# Patient Record
Sex: FEMALE | Race: WHITE | Employment: UNEMPLOYED | URBAN - METROPOLITAN AREA
[De-identification: names, ages, dates, MRNs, and addresses within clinical notes are randomized per-mention and may not be internally consistent; named-entity substitution may affect disease eponyms.]

---

## 2022-08-21 ENCOUNTER — HOSPITAL ENCOUNTER (INPATIENT)
Age: 57
LOS: 5 days | Discharge: HOME | DRG: 174 | End: 2022-08-26
Attending: EMERGENCY MEDICINE | Admitting: STUDENT IN AN ORGANIZED HEALTH CARE EDUCATION/TRAINING PROGRAM
Payer: COMMERCIAL

## 2022-08-21 ENCOUNTER — APPOINTMENT (OUTPATIENT)
Dept: GENERAL RADIOLOGY | Age: 57
DRG: 174 | End: 2022-08-21
Attending: STUDENT IN AN ORGANIZED HEALTH CARE EDUCATION/TRAINING PROGRAM
Payer: COMMERCIAL

## 2022-08-21 ENCOUNTER — APPOINTMENT (OUTPATIENT)
Dept: GENERAL RADIOLOGY | Age: 57
DRG: 174 | End: 2022-08-21
Attending: EMERGENCY MEDICINE
Payer: COMMERCIAL

## 2022-08-21 DIAGNOSIS — E87.1 HYPONATREMIA: ICD-10-CM

## 2022-08-21 DIAGNOSIS — I50.21 ACUTE HFREF (HEART FAILURE WITH REDUCED EJECTION FRACTION) (HCC): ICD-10-CM

## 2022-08-21 DIAGNOSIS — I21.3 ST ELEVATION MYOCARDIAL INFARCTION (STEMI), UNSPECIFIED ARTERY (HCC): ICD-10-CM

## 2022-08-21 DIAGNOSIS — I21.09 ST ELEVATION MYOCARDIAL INFARCTION (STEMI) OF ANTERIOR WALL (HCC): Primary | ICD-10-CM

## 2022-08-21 DIAGNOSIS — I21.02 ST ELEVATION MYOCARDIAL INFARCTION INVOLVING LEFT ANTERIOR DESCENDING (LAD) CORONARY ARTERY (HCC): ICD-10-CM

## 2022-08-21 DIAGNOSIS — G40.909 NONINTRACTABLE EPILEPSY WITHOUT STATUS EPILEPTICUS, UNSPECIFIED EPILEPSY TYPE (HCC): ICD-10-CM

## 2022-08-21 LAB
ACT BLD: 265 SECS (ref 79–138)
ACT BLD: 341 SECS (ref 79–138)
ALBUMIN SERPL-MCNC: 2.3 G/DL (ref 3.5–5)
ALBUMIN SERPL-MCNC: 2.8 G/DL (ref 3.5–5)
ALBUMIN SERPL-MCNC: 3.5 G/DL (ref 3.5–5)
ALBUMIN/GLOB SERPL: 0.9 {RATIO} (ref 1.1–2.2)
ALP SERPL-CCNC: 87 U/L (ref 45–117)
ALT SERPL-CCNC: 33 U/L (ref 12–78)
ANION GAP SERPL CALC-SCNC: 10 MMOL/L (ref 5–15)
ANION GAP SERPL CALC-SCNC: 12 MMOL/L (ref 5–15)
ANION GAP SERPL CALC-SCNC: 9 MMOL/L (ref 5–15)
APPEARANCE UR: CLEAR
AST SERPL-CCNC: 139 U/L (ref 15–37)
ATRIAL RATE: 105 BPM
ATRIAL RATE: 106 BPM
ATRIAL RATE: 86 BPM
BACTERIA URNS QL MICRO: NEGATIVE /HPF
BASOPHILS # BLD: 0 K/UL (ref 0–0.1)
BASOPHILS NFR BLD: 0 % (ref 0–1)
BILIRUB SERPL-MCNC: 0.7 MG/DL (ref 0.2–1)
BILIRUB UR QL: NEGATIVE
BNP SERPL-MCNC: ABNORMAL PG/ML
BUN SERPL-MCNC: 4 MG/DL (ref 6–20)
BUN SERPL-MCNC: 5 MG/DL (ref 6–20)
BUN SERPL-MCNC: 6 MG/DL (ref 6–20)
BUN/CREAT SERPL: 11 (ref 12–20)
BUN/CREAT SERPL: 8 (ref 12–20)
BUN/CREAT SERPL: 9 (ref 12–20)
CALCIUM SERPL-MCNC: 6.3 MG/DL (ref 8.5–10.1)
CALCIUM SERPL-MCNC: 8.1 MG/DL (ref 8.5–10.1)
CALCIUM SERPL-MCNC: 8.8 MG/DL (ref 8.5–10.1)
CALCULATED P AXIS, ECG09: 52 DEGREES
CALCULATED P AXIS, ECG09: 66 DEGREES
CALCULATED P AXIS, ECG09: 75 DEGREES
CALCULATED R AXIS, ECG10: 40 DEGREES
CALCULATED R AXIS, ECG10: 56 DEGREES
CALCULATED R AXIS, ECG10: 76 DEGREES
CALCULATED T AXIS, ECG11: 7 DEGREES
CALCULATED T AXIS, ECG11: 82 DEGREES
CALCULATED T AXIS, ECG11: 98 DEGREES
CHLORIDE SERPL-SCNC: 84 MMOL/L (ref 97–108)
CHLORIDE SERPL-SCNC: 85 MMOL/L (ref 97–108)
CHLORIDE SERPL-SCNC: 97 MMOL/L (ref 97–108)
CHLORIDE UR-SCNC: 12 MMOL/L
CHOLEST SERPL-MCNC: 221 MG/DL
CO2 SERPL-SCNC: 21 MMOL/L (ref 21–32)
CO2 SERPL-SCNC: 22 MMOL/L (ref 21–32)
CO2 SERPL-SCNC: 24 MMOL/L (ref 21–32)
COLOR UR: ABNORMAL
COMMENT, HOLDF: NORMAL
CREAT SERPL-MCNC: 0.35 MG/DL (ref 0.55–1.02)
CREAT SERPL-MCNC: 0.6 MG/DL (ref 0.55–1.02)
CREAT SERPL-MCNC: 0.64 MG/DL (ref 0.55–1.02)
CREAT UR-MCNC: 43.7 MG/DL
D DIMER PPP FEU-MCNC: <0.19 MG/L FEU (ref 0–0.65)
DIAGNOSIS, 93000: NORMAL
DIFFERENTIAL METHOD BLD: ABNORMAL
EOSINOPHIL # BLD: 0 K/UL (ref 0–0.4)
EOSINOPHIL NFR BLD: 0 % (ref 0–7)
EPITH CASTS URNS QL MICRO: ABNORMAL /LPF
ERYTHROCYTE [DISTWIDTH] IN BLOOD BY AUTOMATED COUNT: 12.4 % (ref 11.5–14.5)
GLOBULIN SER CALC-MCNC: 3.8 G/DL (ref 2–4)
GLUCOSE SERPL-MCNC: 130 MG/DL (ref 65–100)
GLUCOSE SERPL-MCNC: 146 MG/DL (ref 65–100)
GLUCOSE SERPL-MCNC: 96 MG/DL (ref 65–100)
GLUCOSE UR STRIP.AUTO-MCNC: NEGATIVE MG/DL
HCT VFR BLD AUTO: 39.6 % (ref 35–47)
HDLC SERPL-MCNC: 57 MG/DL
HDLC SERPL: 3.9 {RATIO} (ref 0–5)
HGB BLD-MCNC: 14.6 G/DL (ref 11.5–16)
HGB UR QL STRIP: NEGATIVE
IMM GRANULOCYTES # BLD AUTO: 0 K/UL (ref 0–0.04)
IMM GRANULOCYTES NFR BLD AUTO: 0 % (ref 0–0.5)
KETONES UR QL STRIP.AUTO: 80 MG/DL
LDLC SERPL CALC-MCNC: 152 MG/DL (ref 0–100)
LEUKOCYTE ESTERASE UR QL STRIP.AUTO: NEGATIVE
LYMPHOCYTES # BLD: 1.3 K/UL (ref 0.8–3.5)
LYMPHOCYTES NFR BLD: 11 % (ref 12–49)
MAGNESIUM SERPL-MCNC: 1.6 MG/DL (ref 1.6–2.4)
MCH RBC QN AUTO: 32.8 PG (ref 26–34)
MCHC RBC AUTO-ENTMCNC: 36.9 G/DL (ref 30–36.5)
MCV RBC AUTO: 89 FL (ref 80–99)
MONOCYTES # BLD: 1.6 K/UL (ref 0–1)
MONOCYTES NFR BLD: 13 % (ref 5–13)
NEUTS SEG # BLD: 9.3 K/UL (ref 1.8–8)
NEUTS SEG NFR BLD: 76 % (ref 32–75)
NITRITE UR QL STRIP.AUTO: NEGATIVE
NRBC # BLD: 0 K/UL (ref 0–0.01)
NRBC BLD-RTO: 0 PER 100 WBC
OSMOLALITY SERPL: 261 MOSM/KG H2O
OSMOLALITY UR: 453 MOSM/KG H2O
OSMOLALITY UR: 777 MOSM/KG H2O
P-R INTERVAL, ECG05: 128 MS
P-R INTERVAL, ECG05: 130 MS
P-R INTERVAL, ECG05: 136 MS
PH UR STRIP: 6.5 [PH] (ref 5–8)
PHOSPHATE SERPL-MCNC: 1.9 MG/DL (ref 2.6–4.7)
PHOSPHATE SERPL-MCNC: 2.1 MG/DL (ref 2.6–4.7)
PLATELET # BLD AUTO: 298 K/UL (ref 150–400)
PMV BLD AUTO: 9.5 FL (ref 8.9–12.9)
POTASSIUM SERPL-SCNC: 2.6 MMOL/L (ref 3.5–5.1)
POTASSIUM SERPL-SCNC: 3.5 MMOL/L (ref 3.5–5.1)
POTASSIUM SERPL-SCNC: 3.6 MMOL/L (ref 3.5–5.1)
PROT SERPL-MCNC: 7.3 G/DL (ref 6.4–8.2)
PROT UR STRIP-MCNC: 100 MG/DL
Q-T INTERVAL, ECG07: 342 MS
Q-T INTERVAL, ECG07: 356 MS
Q-T INTERVAL, ECG07: 356 MS
QRS DURATION, ECG06: 78 MS
QRS DURATION, ECG06: 78 MS
QRS DURATION, ECG06: 80 MS
QTC CALCULATION (BEZET), ECG08: 426 MS
QTC CALCULATION (BEZET), ECG08: 452 MS
QTC CALCULATION (BEZET), ECG08: 472 MS
RBC # BLD AUTO: 4.45 M/UL (ref 3.8–5.2)
RBC #/AREA URNS HPF: ABNORMAL /HPF (ref 0–5)
SAMPLES BEING HELD,HOLD: NORMAL
SODIUM SERPL-SCNC: 117 MMOL/L (ref 136–145)
SODIUM SERPL-SCNC: 119 MMOL/L (ref 136–145)
SODIUM SERPL-SCNC: 128 MMOL/L (ref 136–145)
SODIUM UR-SCNC: 14 MMOL/L
SODIUM UR-SCNC: 5 MMOL/L
SP GR UR REFRACTOMETRY: 1.01
T3FREE SERPL-MCNC: 1.2 PG/ML (ref 2.2–4)
T4 FREE SERPL-MCNC: 0.9 NG/DL (ref 0.8–1.5)
TRIGL SERPL-MCNC: 60 MG/DL (ref ?–150)
TROPONIN-HIGH SENSITIVITY: ABNORMAL NG/L (ref 0–51)
TSH SERPL DL<=0.05 MIU/L-ACNC: 1.44 UIU/ML (ref 0.36–3.74)
UROBILINOGEN UR QL STRIP.AUTO: 1 EU/DL (ref 0.2–1)
VENTRICULAR RATE, ECG03: 105 BPM
VENTRICULAR RATE, ECG03: 106 BPM
VENTRICULAR RATE, ECG03: 86 BPM
VLDLC SERPL CALC-MCNC: 12 MG/DL
WBC # BLD AUTO: 12.3 K/UL (ref 3.6–11)
WBC URNS QL MICRO: ABNORMAL /HPF (ref 0–4)

## 2022-08-21 PROCEDURE — 80061 LIPID PANEL: CPT

## 2022-08-21 PROCEDURE — 74011000636 HC RX REV CODE- 636: Performed by: STUDENT IN AN ORGANIZED HEALTH CARE EDUCATION/TRAINING PROGRAM

## 2022-08-21 PROCEDURE — 65620000000 HC RM CCU GENERAL

## 2022-08-21 PROCEDURE — C1769 GUIDE WIRE: HCPCS | Performed by: STUDENT IN AN ORGANIZED HEALTH CARE EDUCATION/TRAINING PROGRAM

## 2022-08-21 PROCEDURE — 84481 FREE ASSAY (FT-3): CPT

## 2022-08-21 PROCEDURE — 77030004532 HC CATH ANGI DX IMP BSC -A: Performed by: STUDENT IN AN ORGANIZED HEALTH CARE EDUCATION/TRAINING PROGRAM

## 2022-08-21 PROCEDURE — 93005 ELECTROCARDIOGRAM TRACING: CPT

## 2022-08-21 PROCEDURE — 85379 FIBRIN DEGRADATION QUANT: CPT

## 2022-08-21 PROCEDURE — 85347 COAGULATION TIME ACTIVATED: CPT

## 2022-08-21 PROCEDURE — 83930 ASSAY OF BLOOD OSMOLALITY: CPT

## 2022-08-21 PROCEDURE — 77030018729 HC ELECTRD DEFIB PAD CARD -B: Performed by: STUDENT IN AN ORGANIZED HEALTH CARE EDUCATION/TRAINING PROGRAM

## 2022-08-21 PROCEDURE — C1725 CATH, TRANSLUMIN NON-LASER: HCPCS | Performed by: STUDENT IN AN ORGANIZED HEALTH CARE EDUCATION/TRAINING PROGRAM

## 2022-08-21 PROCEDURE — 99152 MOD SED SAME PHYS/QHP 5/>YRS: CPT | Performed by: STUDENT IN AN ORGANIZED HEALTH CARE EDUCATION/TRAINING PROGRAM

## 2022-08-21 PROCEDURE — 74011250637 HC RX REV CODE- 250/637: Performed by: STUDENT IN AN ORGANIZED HEALTH CARE EDUCATION/TRAINING PROGRAM

## 2022-08-21 PROCEDURE — 74011250637 HC RX REV CODE- 250/637: Performed by: INTERNAL MEDICINE

## 2022-08-21 PROCEDURE — 81001 URINALYSIS AUTO W/SCOPE: CPT

## 2022-08-21 PROCEDURE — 84300 ASSAY OF URINE SODIUM: CPT

## 2022-08-21 PROCEDURE — 92921 HC PTCA SNGL MAJOR COR VESL/BRNCH  EA ADD LC: CPT | Performed by: STUDENT IN AN ORGANIZED HEALTH CARE EDUCATION/TRAINING PROGRAM

## 2022-08-21 PROCEDURE — 96374 THER/PROPH/DIAG INJ IV PUSH: CPT

## 2022-08-21 PROCEDURE — 4A023N7 MEASUREMENT OF CARDIAC SAMPLING AND PRESSURE, LEFT HEART, PERCUTANEOUS APPROACH: ICD-10-PCS | Performed by: STUDENT IN AN ORGANIZED HEALTH CARE EDUCATION/TRAINING PROGRAM

## 2022-08-21 PROCEDURE — 77030013715 HC INFL SYS MRTM -B: Performed by: STUDENT IN AN ORGANIZED HEALTH CARE EDUCATION/TRAINING PROGRAM

## 2022-08-21 PROCEDURE — C1894 INTRO/SHEATH, NON-LASER: HCPCS | Performed by: STUDENT IN AN ORGANIZED HEALTH CARE EDUCATION/TRAINING PROGRAM

## 2022-08-21 PROCEDURE — 99285 EMERGENCY DEPT VISIT HI MDM: CPT

## 2022-08-21 PROCEDURE — 84443 ASSAY THYROID STIM HORMONE: CPT

## 2022-08-21 PROCEDURE — C1874 STENT, COATED/COV W/DEL SYS: HCPCS | Performed by: STUDENT IN AN ORGANIZED HEALTH CARE EDUCATION/TRAINING PROGRAM

## 2022-08-21 PROCEDURE — 82570 ASSAY OF URINE CREATININE: CPT

## 2022-08-21 PROCEDURE — 84484 ASSAY OF TROPONIN QUANT: CPT

## 2022-08-21 PROCEDURE — 99153 MOD SED SAME PHYS/QHP EA: CPT | Performed by: STUDENT IN AN ORGANIZED HEALTH CARE EDUCATION/TRAINING PROGRAM

## 2022-08-21 PROCEDURE — 83935 ASSAY OF URINE OSMOLALITY: CPT

## 2022-08-21 PROCEDURE — 74011250636 HC RX REV CODE- 250/636: Performed by: STUDENT IN AN ORGANIZED HEALTH CARE EDUCATION/TRAINING PROGRAM

## 2022-08-21 PROCEDURE — 77030012468 HC VLV BLEEDBK CNTRL ABBT -B: Performed by: STUDENT IN AN ORGANIZED HEALTH CARE EDUCATION/TRAINING PROGRAM

## 2022-08-21 PROCEDURE — 74011250636 HC RX REV CODE- 250/636: Performed by: EMERGENCY MEDICINE

## 2022-08-21 PROCEDURE — 92928 PRQ TCAT PLMT NTRAC ST 1 LES: CPT | Performed by: STUDENT IN AN ORGANIZED HEALTH CARE EDUCATION/TRAINING PROGRAM

## 2022-08-21 PROCEDURE — 82436 ASSAY OF URINE CHLORIDE: CPT

## 2022-08-21 PROCEDURE — 80053 COMPREHEN METABOLIC PANEL: CPT

## 2022-08-21 PROCEDURE — B2111ZZ FLUOROSCOPY OF MULTIPLE CORONARY ARTERIES USING LOW OSMOLAR CONTRAST: ICD-10-PCS | Performed by: STUDENT IN AN ORGANIZED HEALTH CARE EDUCATION/TRAINING PROGRAM

## 2022-08-21 PROCEDURE — C1887 CATHETER, GUIDING: HCPCS | Performed by: STUDENT IN AN ORGANIZED HEALTH CARE EDUCATION/TRAINING PROGRAM

## 2022-08-21 PROCEDURE — 83735 ASSAY OF MAGNESIUM: CPT

## 2022-08-21 PROCEDURE — 92941 PRQ TRLML REVSC TOT OCCL AMI: CPT | Performed by: STUDENT IN AN ORGANIZED HEALTH CARE EDUCATION/TRAINING PROGRAM

## 2022-08-21 PROCEDURE — 83880 ASSAY OF NATRIURETIC PEPTIDE: CPT

## 2022-08-21 PROCEDURE — 77030019569 HC BND COMPR RAD TERU -B: Performed by: STUDENT IN AN ORGANIZED HEALTH CARE EDUCATION/TRAINING PROGRAM

## 2022-08-21 PROCEDURE — 027135Z DILATION OF CORONARY ARTERY, TWO ARTERIES WITH TWO DRUG-ELUTING INTRALUMINAL DEVICES, PERCUTANEOUS APPROACH: ICD-10-PCS | Performed by: STUDENT IN AN ORGANIZED HEALTH CARE EDUCATION/TRAINING PROGRAM

## 2022-08-21 PROCEDURE — 93571 IV DOP VEL&/PRESS C FLO 1ST: CPT | Performed by: STUDENT IN AN ORGANIZED HEALTH CARE EDUCATION/TRAINING PROGRAM

## 2022-08-21 PROCEDURE — 74011250636 HC RX REV CODE- 250/636: Performed by: NURSE PRACTITIONER

## 2022-08-21 PROCEDURE — 74011000250 HC RX REV CODE- 250: Performed by: STUDENT IN AN ORGANIZED HEALTH CARE EDUCATION/TRAINING PROGRAM

## 2022-08-21 PROCEDURE — 96375 TX/PRO/DX INJ NEW DRUG ADDON: CPT

## 2022-08-21 PROCEDURE — 74011000258 HC RX REV CODE- 258: Performed by: INTERNAL MEDICINE

## 2022-08-21 PROCEDURE — 84439 ASSAY OF FREE THYROXINE: CPT

## 2022-08-21 PROCEDURE — 74011250637 HC RX REV CODE- 250/637: Performed by: EMERGENCY MEDICINE

## 2022-08-21 PROCEDURE — 80069 RENAL FUNCTION PANEL: CPT

## 2022-08-21 PROCEDURE — B2151ZZ FLUOROSCOPY OF LEFT HEART USING LOW OSMOLAR CONTRAST: ICD-10-PCS | Performed by: STUDENT IN AN ORGANIZED HEALTH CARE EDUCATION/TRAINING PROGRAM

## 2022-08-21 PROCEDURE — 36415 COLL VENOUS BLD VENIPUNCTURE: CPT

## 2022-08-21 PROCEDURE — 85025 COMPLETE CBC W/AUTO DIFF WBC: CPT

## 2022-08-21 PROCEDURE — 77030010221 HC SPLNT WR POS TELE -B: Performed by: STUDENT IN AN ORGANIZED HEALTH CARE EDUCATION/TRAINING PROGRAM

## 2022-08-21 PROCEDURE — 93458 L HRT ARTERY/VENTRICLE ANGIO: CPT | Performed by: STUDENT IN AN ORGANIZED HEALTH CARE EDUCATION/TRAINING PROGRAM

## 2022-08-21 PROCEDURE — 71045 X-RAY EXAM CHEST 1 VIEW: CPT

## 2022-08-21 PROCEDURE — 74011250636 HC RX REV CODE- 250/636: Performed by: INTERNAL MEDICINE

## 2022-08-21 DEVICE — STENT RONYX27518UX RESOLUTE ONYX 2.75X18
Type: IMPLANTABLE DEVICE | Status: FUNCTIONAL
Brand: RESOLUTE ONYX™

## 2022-08-21 RX ORDER — GUAIFENESIN 100 MG/5ML
324 LIQUID (ML) ORAL
Status: COMPLETED | OUTPATIENT
Start: 2022-08-21 | End: 2022-08-21

## 2022-08-21 RX ORDER — SODIUM CHLORIDE 9 MG/ML
50 INJECTION, SOLUTION INTRAVENOUS CONTINUOUS
Status: DISCONTINUED | OUTPATIENT
Start: 2022-08-21 | End: 2022-08-21

## 2022-08-21 RX ORDER — FENTANYL CITRATE 50 UG/ML
50 INJECTION, SOLUTION INTRAMUSCULAR; INTRAVENOUS ONCE
Status: COMPLETED | OUTPATIENT
Start: 2022-08-21 | End: 2022-08-21

## 2022-08-21 RX ORDER — CLOPIDOGREL BISULFATE 75 MG/1
75 TABLET ORAL DAILY
Status: DISCONTINUED | OUTPATIENT
Start: 2022-08-22 | End: 2022-08-26 | Stop reason: HOSPADM

## 2022-08-21 RX ORDER — SODIUM,POTASSIUM PHOSPHATES 280-250MG
2 POWDER IN PACKET (EA) ORAL 4 TIMES DAILY
Status: COMPLETED | OUTPATIENT
Start: 2022-08-21 | End: 2022-08-22

## 2022-08-21 RX ORDER — HEPARIN SODIUM 10000 [USP'U]/100ML
12-25 INJECTION, SOLUTION INTRAVENOUS
Status: DISCONTINUED | OUTPATIENT
Start: 2022-08-21 | End: 2022-08-21

## 2022-08-21 RX ORDER — DEXTROSE MONOHYDRATE 50 MG/ML
75 INJECTION, SOLUTION INTRAVENOUS CONTINUOUS
Status: DISCONTINUED | OUTPATIENT
Start: 2022-08-21 | End: 2022-08-21

## 2022-08-21 RX ORDER — SODIUM CHLORIDE 0.9 % (FLUSH) 0.9 %
5-40 SYRINGE (ML) INJECTION EVERY 8 HOURS
Status: DISCONTINUED | OUTPATIENT
Start: 2022-08-21 | End: 2022-08-26 | Stop reason: HOSPADM

## 2022-08-21 RX ORDER — HEPARIN SODIUM 1000 [USP'U]/ML
INJECTION, SOLUTION INTRAVENOUS; SUBCUTANEOUS AS NEEDED
Status: DISCONTINUED | OUTPATIENT
Start: 2022-08-21 | End: 2022-08-21 | Stop reason: HOSPADM

## 2022-08-21 RX ORDER — CLOPIDOGREL 300 MG/1
600 TABLET, FILM COATED ORAL ONCE
Status: COMPLETED | OUTPATIENT
Start: 2022-08-21 | End: 2022-08-21

## 2022-08-21 RX ORDER — FENTANYL CITRATE 50 UG/ML
INJECTION, SOLUTION INTRAMUSCULAR; INTRAVENOUS AS NEEDED
Status: DISCONTINUED | OUTPATIENT
Start: 2022-08-21 | End: 2022-08-21 | Stop reason: HOSPADM

## 2022-08-21 RX ORDER — PHENOBARBITAL 64.8 MG/1
324 TABLET ORAL
Status: DISCONTINUED | OUTPATIENT
Start: 2022-08-21 | End: 2022-08-26 | Stop reason: HOSPADM

## 2022-08-21 RX ORDER — MIDAZOLAM HYDROCHLORIDE 1 MG/ML
INJECTION, SOLUTION INTRAMUSCULAR; INTRAVENOUS AS NEEDED
Status: DISCONTINUED | OUTPATIENT
Start: 2022-08-21 | End: 2022-08-21 | Stop reason: HOSPADM

## 2022-08-21 RX ORDER — HEPARIN SODIUM 200 [USP'U]/100ML
INJECTION, SOLUTION INTRAVENOUS
Status: COMPLETED | OUTPATIENT
Start: 2022-08-21 | End: 2022-08-21

## 2022-08-21 RX ORDER — GUAIFENESIN 100 MG/5ML
81 LIQUID (ML) ORAL DAILY
Status: DISCONTINUED | OUTPATIENT
Start: 2022-08-21 | End: 2022-08-26 | Stop reason: HOSPADM

## 2022-08-21 RX ORDER — ATORVASTATIN CALCIUM 40 MG/1
80 TABLET, FILM COATED ORAL DAILY
Status: DISCONTINUED | OUTPATIENT
Start: 2022-08-21 | End: 2022-08-26 | Stop reason: HOSPADM

## 2022-08-21 RX ORDER — METOPROLOL TARTRATE 25 MG/1
12.5 TABLET, FILM COATED ORAL EVERY 12 HOURS
Status: DISCONTINUED | OUTPATIENT
Start: 2022-08-21 | End: 2022-08-25

## 2022-08-21 RX ORDER — LIDOCAINE HYDROCHLORIDE 10 MG/ML
INJECTION INFILTRATION; PERINEURAL AS NEEDED
Status: DISCONTINUED | OUTPATIENT
Start: 2022-08-21 | End: 2022-08-21 | Stop reason: HOSPADM

## 2022-08-21 RX ORDER — 3% SODIUM CHLORIDE 3 G/100ML
25 INJECTION, SOLUTION INTRAVENOUS CONTINUOUS
Status: DISPENSED | OUTPATIENT
Start: 2022-08-21 | End: 2022-08-21

## 2022-08-21 RX ORDER — SODIUM CHLORIDE 0.9 % (FLUSH) 0.9 %
5-40 SYRINGE (ML) INJECTION AS NEEDED
Status: DISCONTINUED | OUTPATIENT
Start: 2022-08-21 | End: 2022-08-26 | Stop reason: HOSPADM

## 2022-08-21 RX ORDER — HEPARIN SODIUM 1000 [USP'U]/ML
60 INJECTION, SOLUTION INTRAVENOUS; SUBCUTANEOUS ONCE
Status: COMPLETED | OUTPATIENT
Start: 2022-08-21 | End: 2022-08-21

## 2022-08-21 RX ORDER — ENOXAPARIN SODIUM 100 MG/ML
40 INJECTION SUBCUTANEOUS EVERY 24 HOURS
Status: DISCONTINUED | OUTPATIENT
Start: 2022-08-21 | End: 2022-08-26 | Stop reason: HOSPADM

## 2022-08-21 RX ADMIN — DEXTROSE MONOHYDRATE 75 ML/HR: 50 INJECTION, SOLUTION INTRAVENOUS at 14:50

## 2022-08-21 RX ADMIN — ASPIRIN 81 MG CHEWABLE TABLET 81 MG: 81 TABLET CHEWABLE at 11:23

## 2022-08-21 RX ADMIN — ENOXAPARIN SODIUM 40 MG: 100 INJECTION SUBCUTANEOUS at 14:46

## 2022-08-21 RX ADMIN — POTASSIUM & SODIUM PHOSPHATES POWDER PACK 280-160-250 MG 2 PACKET: 280-160-250 PACK at 20:44

## 2022-08-21 RX ADMIN — PHENOBARBITAL 324 MG: 64.8 TABLET ORAL at 20:44

## 2022-08-21 RX ADMIN — CLOPIDOGREL BISULFATE 600 MG: 300 TABLET, FILM COATED ORAL at 20:43

## 2022-08-21 RX ADMIN — ASPIRIN 81 MG CHEWABLE TABLET 324 MG: 81 TABLET CHEWABLE at 07:44

## 2022-08-21 RX ADMIN — HEPARIN SODIUM 3750 UNITS: 1000 INJECTION INTRAVENOUS; SUBCUTANEOUS at 08:09

## 2022-08-21 RX ADMIN — SODIUM CHLORIDE, PRESERVATIVE FREE 10 ML: 5 INJECTION INTRAVENOUS at 16:01

## 2022-08-21 RX ADMIN — ATORVASTATIN CALCIUM 80 MG: 40 TABLET, FILM COATED ORAL at 11:25

## 2022-08-21 RX ADMIN — SODIUM CHLORIDE 25 ML/HR: 3 INJECTION, SOLUTION INTRAVENOUS at 20:44

## 2022-08-21 RX ADMIN — FENTANYL CITRATE 50 MCG: 50 INJECTION, SOLUTION INTRAMUSCULAR; INTRAVENOUS at 07:44

## 2022-08-21 RX ADMIN — METOPROLOL TARTRATE 12.5 MG: 25 TABLET, FILM COATED ORAL at 11:25

## 2022-08-21 RX ADMIN — POTASSIUM & SODIUM PHOSPHATES POWDER PACK 280-160-250 MG 2 PACKET: 280-160-250 PACK at 17:35

## 2022-08-21 RX ADMIN — POTASSIUM & SODIUM PHOSPHATES POWDER PACK 280-160-250 MG 2 PACKET: 280-160-250 PACK at 14:46

## 2022-08-21 RX ADMIN — SODIUM CHLORIDE 100 ML/HR: 9 INJECTION, SOLUTION INTRAVENOUS at 11:31

## 2022-08-21 RX ADMIN — DESMOPRESSIN ACETATE 2 MCG: 4 SOLUTION INTRAVENOUS at 14:47

## 2022-08-21 NOTE — PROGRESS NOTES
Overnight Nephrology Cover    Paged by RN with evening sodium - now 117 (119 this am, 128 this afternoon, 117 this evening)  - Suspect previous lab was contaminated by saline - calcium, creatinine, and potassium were also low  - Stop D5W  - Give 3% saline now, 25ml/hr x 4 hours = 100ml total  - Recheck sodium following infusion  - Plan of care discussed over phone with bedside nurse    --Yris Suarez NP  [facility]

## 2022-08-21 NOTE — H&P
Admission      [facility] Cardiology Admission H&P    Date of consult:  08/21/22  Date of admission: 8/21/2022  Primary Cardiologist: none  Physician Requesting consult: Dr. Linnette Caceres    ______________________________________________________________________________    Assessment/Plan:    STEMI - chest pain and lateral YARED  - s/p aspirin 325 m  - heparin bolus  - to cath with Dr. Peggy Almonte  - echo  - start atorvastatin 80 mg daily  - lipid panel/A1c    2. Seizure d/o  - continue home phenobarbital and oxcarbazepine once nightly    3. Hyponatremia  - possibly from dehdyration but her antiepileptics may be contributing  - given IVF in ER. Repeat  this afternoon    4. Family h/o premature CAD    ________________________________________________________________________________    CC / Reason for consult: chest pain    History of the presenting illness:  Johanny Forte is a 64 y.o. with h/o seizure d/o and family h/o premature CAD who has had intermittent exertional chest pain since Wed that became worse last night. Currently having mild back pain. Denies other complaints. She has no h/o coronary or other cardiovascular disease. Brother had PCI at age 52    No h/o HTN, HLD or DM. No tobacco or etoh use. PMH  - seizure d/o    Prior to Admission medications    Medication Sig Start Date End Date Taking? Authorizing Provider   OXcarbazepine (TRILEPTAL) 600 mg tablet Take 300 mg by mouth two (2) times a day. Other, MD Bull   PHENOBARBITAL, BULK, by Does Not Apply route. Bull Liu MD   LEVOTHYROXINE SODIUM (SYNTHROID PO) Take  by mouth.       Bull Liu MD       Family history as above    Social History     Socioeconomic History    Marital status:      Spouse name: Not on file    Number of children: Not on file    Years of education: Not on file    Highest education level: Not on file   Occupational History    Not on file   Tobacco Use    Smoking status: Not on file    Smokeless tobacco: Not on file Substance and Sexual Activity    Alcohol use: Not on file    Drug use: Not on file    Sexual activity: Not on file   Other Topics Concern    Not on file   Social History Narrative    Not on file     Social Determinants of Health     Financial Resource Strain: Not on file   Food Insecurity: Not on file   Transportation Needs: Not on file   Physical Activity: Not on file   Stress: Not on file   Social Connections: Not on file   Intimate Partner Violence: Not on file   Housing Stability: Not on file         ROS - ROS: All other systems reviewed and were negative other than mentioned above.      Visit Vitals  /85 (BP 1 Location: Left upper arm, BP Patient Position: At rest)   Pulse 100   Temp 98 °F (36.7 °C)   Resp 16   Ht 5' 9\" (1.753 m)   Wt 62.5 kg (137 lb 12.8 oz)   SpO2 99%   BMI 20.35 kg/m²     Physical Exam  General: resting comfortably in no distress  HEENT: sclera anicteric, moist mucous membranes  Neck: supple, no JVD or HJR  CV: regular rate and rhythm, normal S1 and S2, no murmurs, rubs or gallops, 2+ radial pulses bilaterally  Lungs: no respiratory distress, lungs clear to auscultation without wheezing or rales  Abdomen: + bowel sounds, soft, non distended, non tender  MSK: no lower extremity edema  Skin: warm to touch  Neuro: alert and oriented, answered questions appropriately  Psych: normal mood and affect     Lab review:  BMP:   Lab Results   Component Value Date/Time     (LL) 08/21/2022 06:51 AM    K 3.6 08/21/2022 06:51 AM    CL 85 (L) 08/21/2022 06:51 AM    CO2 22 08/21/2022 06:51 AM    AGAP 12 08/21/2022 06:51 AM     (H) 08/21/2022 06:51 AM    BUN 5 (L) 08/21/2022 06:51 AM    CREA 0.60 08/21/2022 06:51 AM    GFRAA >60 08/21/2022 06:51 AM    GFRNA >60 08/21/2022 06:51 AM        CBC:  Lab Results   Component Value Date/Time    WBC 12.3 (H) 08/21/2022 06:51 AM    HGB 14.6 08/21/2022 06:51 AM    HCT 39.6 08/21/2022 06:51 AM    PLATELET 697 82/74/1427 06:51 AM    MCV 89.0 08/21/2022 06:51 AM       All Cardiac Markers in the last 24 hours:    Lab Results   Component Value Date/Time    BNPNT 12,548 (H) 08/21/2022 06:51 AM       Data review:  EKG tracing personally reviewed: initial ECG sinus tach with sub 1 mm inferolateral YARED. Repeat ECG ST with 1 mm lateral YARED    Total critical care time - 45 minutes (CPT 53645)      MDM:  High  Risk of decompensation:  High    I personally spent the above critical care time. This is time spent at this critically ill patient's bedside / unit / floor actively involved in patient care as well as the coordination of care with consulting services, nurses and discussions with the patient's family. This does not include any procedural time which has been billed separately. This patient has a critical illness or injury that is acutely impairing one or more vital organ systems such that there is a high probability of imminent or life threatening deterioration in the patient's condition. This patient requires high complexity medical decision making to assess, manipulate, and support vital organ system failure. After stabilization, this patient still requires management to prevent further life / organ threatening deterioration. Signed:  Indy Hager.  Gayla Ansari, [facility]  08/21/22

## 2022-08-21 NOTE — PROGRESS NOTES
Brief Procedure Note:         Indication:   Ms Mackenzie Moore presented with chest pain, on 2nd EKG at 8.00 am she was diagnosed with ST elevation MI.  Cath lab was called at 8 am.     Procedure:   LHC, Cors, LV gram   Rt radial artery access   PCI of pLAD   IFR of Lcx to OM   PCI of Lcx to OM, PTCA of jailed Lcx     Complications: None   Blood loss: Minimal   Condition: Stable     Brief procedure Result:   Severe 99% disease of proximal LAD with JAY 1 flow (Culprit)   Severe disease of small caliber OM branch - medical therapy   Intermediate Lcx>OM disease hemodynamically significant by IFR   PCI of LAD with ROHINI x1   PCI of Lcx to OM with ROHINI x1   Reduced LVEF 25-30% with anteroseptal, anterior and apical hypokinesis   Elevated left sided filling pressures     Recommendations:     Cont aspirin and ticagrelor   High intensity statin   Toprol 12.5 mg po BID   Add losartan or Entresto depending on blood pressure   Check 2d echocardiogram   Routine post procedure care   Patient does not want any family to be called at this time       Full note and recommendations to follow. '

## 2022-08-21 NOTE — PROGRESS NOTES
1600- Report obtained from [address] [place] [address]  Mild bruising and trace swelling at right arm cath site. Will continue to monitor. 1800- Blood drawn for BMP.    1900- Results called to Sepideh Perea NP. He will place orders. 1930- Report given to [facility].

## 2022-08-21 NOTE — PROGRESS NOTES
TRANSFER - OUT REPORT:    Verbal report given to [facility] RN (name) on Kasi Boothe  being transferred to CCU 24 (unit) for routine progression of care       Report consisted of patients Situation, Background, Assessment and   Recommendations(SBAR). Information from the following report(s) SBAR, Procedure Summary, MAR, and Cardiac Rhythm NSR  was reviewed with the receiving nurse. Lines:   Peripheral IV 08/21/22 Left Antecubital (Active)   Site Assessment Clean, dry, & intact 08/21/22 0652   Phlebitis Assessment 0 08/21/22 0652   Infiltration Assessment 0 08/21/22 0652   Dressing Status Clean, dry, & intact 08/21/22 0652   Dressing Type Transparent 08/21/22 0652   Hub Color/Line Status Pink 08/21/22 6234        Opportunity for questions and clarification was provided.       Patient transported with:   Monitor  Registered Nurse  Tech

## 2022-08-21 NOTE — ED TRIAGE NOTES
Pt arrives from home with CC of chest pain. Pt desribes it as \"someone squeezing my heart\". The pain is in the center of her chest and worsens with movement/deep breaths.      Denies nausea, vomiting, and SOB    Hx of epilepsy

## 2022-08-21 NOTE — PROGRESS NOTES
Spiritual Care Assessment/Progress Note  [facility]      NAME: Ange Gonzalez      MRN: [id number]  AGE: 64 y.o.  SEX: female  Mosque Affiliation: Shinto   Language: English     8/21/2022     Total Time (in minutes): 16     Spiritual Assessment begun in [facility] 4 CORONARY CARE through conversation with:         [x]Patient        [] Family    [] Friend(s)        Reason for Consult: Initial/Spiritual assessment, critical care     Spiritual beliefs: (Please include comment if needed)     [] Identifies with a javier tradition:         [] Supported by a javier community:            [] Claims no spiritual orientation:           [] Seeking spiritual identity:                [] Adheres to an individual form of spirituality:           [x] Not able to assess:                           Identified resources for coping:      [] Prayer                               [] Music                  [] Guided Imagery     [] Family/friends                 [] Pet visits     [] Devotional reading                         [x] Unknown     [] Other:                                               Interventions offered during this visit: (See comments for more details)    Patient Interventions: Affirmation of emotions/emotional suffering, Initial/Spiritual assessment, patient floor, Prayer (assurance of), Life review/legacy           Plan of Care:     [] Support spiritual and/or cultural needs    [] Support AMD and/or advance care planning process      [] Support grieving process   [] Coordinate Rites and/or Rituals    [] Coordination with community clergy   [] No spiritual needs identified at this time   [] Detailed Plan of Care below (See Comments)  [] Make referral to Music Therapy  [] Make referral to Pet Therapy     [] Make referral to Addiction services  [] Make referral to [facility]  [] Make referral to Spiritual Care Partner  [] No future visits requested        [x] Contact Spiritual Care for further referrals     Comments: Provided support for this pt in [facility] [place] [facility]. Reviewed pt's chart prior to this visit. Facilitated life review to assess potential support needs or coping strategies. Pt offered review of current situation. Pt expressed no support needs at this time. Pt is well supported by her local [employer]. Assured pt of prayers. Zafar Dong MDiv.  Staff   Request  Support/Spiritual Care Services on [phone])

## 2022-08-21 NOTE — CONSULTS
[facility]   [address] [facility], [address] [place] [address]  Phone: [phone]   [phone] NOTE     Patient: Karla Bennett MRN: [id number]  PCP: Other, None, PA   :     1965  Age:   64 y.o. Sex:  female      Referring physician: Unknown MD Alex  Reason for consultation: 64 y.o. female with STEMI (ST elevation myocardial infarction) ([facility] [address] [H71.5] complicated by MARIA E   Admission Date: 2022  7:16 AM  LOS: 0 days      ASSESSMENT and PLAN :   1 Hyponatremia  - suspect from Trileptal + poor solute intake   - will send urine studies and check TSH and cortisol in am   - q8hrs labs  - stat renal panel now   - based on that will make additional recommendations  - stricit in and out       2 STEMI  - s/p  cath  and PCI of LAD with ROHINI x1   PCI of Lcx to OM with ROHINI x1   Reduced LVEF 25-30% with anteroseptal, anterior and apical hypokinesis   Elevated left sided filling pressures  - will need diuretics eventually   - appears comfortable at the moment      3 Seizure  - was on Trileptal as well as phenobarbitol  - hold Treleptal for now   - consider Neurology consult for alternate for Trileptal       4 Nep will follow         Care Plan discussed with:  pt and nurse         Thank you for consulting [facility] in the care of your patient. Subjective:   HPI: Karla Bennett is a 64 y.o.  female who has been admitted to the hospital for Chest pain. She had STEMI and underwent cardiac cath in am and had 2 stent placed to LAD as well as Lt CX . Pt was found to have NA of 119 and so Nep has been consulted. She has been having nause and chest pain for 3 days and so has not been eating. Just drinking fluids. Food made her nausea and chest pain worse. she eventually came to Er. She has not been seen by PCP/     Past Medical Hx:   No past medical history on file.    Seizures   Past Surgical Hx:     Past Surgical History:   Procedure Laterality Date    DELIVERY            Allergies   Allergen Reactions    Dilantin [Phenytoin Sodium Extended] Rash       Social Hx:     No smoking/ alcohol and no drugs   Teaches Twirling in school. No family history on file. Brother has CAD at 37 and  at 50   Review of Systems:  A thorough twelve point review of system was performed today. Pertinent positives and negatives are mentioned in the HPI. The reminder of the ROS is negative and noncontributory. Objective:    Vitals:    Vitals:    22 1145 22 1200 22 1215 22 1230   BP:  (!) 118/55  (!) 102/47   Pulse: 93 94 93 93   Resp:  19 20 17   Temp:       SpO2:  98% 97% 96%   Weight:       Height:         I&O's:  No intake/output data recorded. Visit Vitals  BP (!) 102/47   Pulse 93   Temp 97.5 °F (36.4 °C)   Resp 17   Ht 5' 9\" (1.753 m)   Wt 62.5 kg (137 lb 12.8 oz)   SpO2 96%   BMI 20.35 kg/m²       Physical Exam:  General:  No apparent Distress  HEENT: PERRL,  No Pallor , No Icterus  Neck: Supple,no mass palpable  Lungs : CTA  CVS: RRR, S1 S2 normal, No murmur   Abdomen: Soft, NT, BS +  Extremities:  no Edema  Skin: No rash or lesions. MS: No joint swelling, erythema, warmth  Neurologic: non focal, AAO x 3  Psych: normal affect    Laboratory Results:    Recent Labs     22  0651   *   K 3.6   CL 85*   CO2 22   *   BUN 5*   CREA 0.60   CA 8.8   MG 1.6   ALB 3.5   ALT 33     Recent Labs     22  0651   WBC 12.3*   HGB 14.6   HCT 39.6        No results found for: SDES  No results found for: CULT        Urine dipstick:   No results found for: COLOR, APPRN, SPGRU, REFSG, FELICIANO, PROTU, GLUCU, KETU, BILU, UROU, RENATO, LEUKU, GLUKE, EPSU, BACTU, WBCU, RBCU, CASTS, UCRY    I have reviewed the following:    All pertinent labs, microbiology data, radiology imaging for my assessment     Medications list Personally Reviewed   [x]      Yes     []               No       Medications:  Prior to Admission medications    Medication Sig Start Date End Date Taking? Authorizing Provider   OXcarbazepine (TRILEPTAL) 600 mg tablet Take 300 mg by mouth two (2) times a day. Other, MD Bull   PHENOBARBITAL, BULK, by Does Not Apply route. Other, MD Bull        Thank you for allowing us to participate in the care of this patient. We will follow patient. Please dont hesitate to call with any questions    Berenice Skiff, MD  [facility]   [address] [facility] [address]  Phone - [phone]   Fax - [phone]  [email or web address]

## 2022-08-21 NOTE — PROGRESS NOTES
Cardiology update    STEMI - s/p PCI of proximal LAD which was culprit. PCI of hemodynamically significant LCx to OM1 lesion as well  - continue aspirin 81 mg daily  - switch to plavix due to interaction with phenobarbital. 600 mg loading dose tonight and start 75 mg daily tomorrow  - high intensity atorvastatin    2. Acute HFrEF - LVEF severely reduced on ventriculogram. LVEDP moderately elevated 25-30 mmHg. Stable respiratory status  - continue metoprolol  - will introduce GDMT as tolerated  - obtain echo    3. Severe hyponatremia   - appreciate nephrology input    4. Seizure d/o  - continue phenobarbital  - hold oxcarbazapine due to hyponatremia. Will consult neurology alternative anti-epileptic recommendations tomorrow. Mary Vidal.  Manoj Harkins, [facility]  08/21/22

## 2022-08-21 NOTE — PROGRESS NOTES
Cardiac Cath Lab Procedure Area Arrival Note:    Becka York arrived to Cardiac Cath Lab, Procedure Area. Patient identifiers verified with NAME and DATE OF BIRTH. Procedure verified with patient. Consent forms verified. Allergies verified. Patient informed of procedure and plan of care. Questions answered with review. Patient voiced understanding of procedure and plan of care. Patient on cardiac monitor, non-invasive blood pressure, SPO2 monitor. On room air then placed on O2 @ 2 lpm via nc. IV of normal saline on pump at 100 ml/hr. Patient status doing well without problems. Patient is A&Ox 4. Patient reports no chest pain. Patient medicated during procedure with orders obtained and verified by Dr. Forest Caceres. Refer to patients Cardiac Cath Lab PROCEDURE REPORT for vital signs, assessment, status, and response during procedure, printed at end of case. Printed report on chart or scanned into chart.

## 2022-08-21 NOTE — ED PROVIDER NOTES
30-year-old female with history of seizures, but no prior heart disease, although she does have a family history of heart disease, presents to the emergency department stating that for the last 2 to 3 days she has been having waxing and waning chest tightness that she describes as a squeezing in her heart that radiates from her chest into her left upper extremity and into her back. She states that she has been at a [employer] [place] doing a lot of physical activity for the last week and states that her symptoms significantly worsened with exertion. She has some associated intermittent nausea and decreased p.o. intake. As result of this decreased p.o. food intake she has been drinking a lot of water instead. She denies any leg pain or swelling or history of prior DVT/PE. She states that her pain is slightly worse when she takes deep breath. She took couple doses of aspirin for the last couple days to no significant avail of her symptoms. She does not currently see a cardiologist.      Chest Pain (Angina)   Associated symptoms include nausea and shortness of breath. Pertinent negatives include no abdominal pain, no back pain, no cough, no fever, no headaches, no numbness, no vomiting and no weakness. No past medical history on file. Past Surgical History:   Procedure Laterality Date    DELIVERY            No family history on file.     Social History     Socioeconomic History    Marital status:      Spouse name: Not on file    Number of children: Not on file    Years of education: Not on file    Highest education level: Not on file   Occupational History    Not on file   Tobacco Use    Smoking status: Not on file    Smokeless tobacco: Not on file   Substance and Sexual Activity    Alcohol use: Not on file    Drug use: Not on file    Sexual activity: Not on file   Other Topics Concern    Not on file   Social History Narrative    Not on file     Social Determinants of Health     Financial Resource Strain: Not on file   Food Insecurity: Not on file   Transportation Needs: Not on file   Physical Activity: Not on file   Stress: Not on file   Social Connections: Not on file   Intimate Partner Violence: Not on file   Housing Stability: Not on file         ALLERGIES: Dilantin [phenytoin sodium extended]    Review of Systems   Constitutional:  Positive for activity change and appetite change. Negative for chills and fever. HENT:  Negative for congestion, rhinorrhea, sinus pressure, sneezing and sore throat. Eyes:  Negative for photophobia and visual disturbance. Respiratory:  Positive for shortness of breath. Negative for cough. Cardiovascular:  Positive for chest pain. Gastrointestinal:  Positive for nausea. Negative for abdominal pain, blood in stool, constipation, diarrhea and vomiting. Genitourinary:  Negative for difficulty urinating, dysuria, flank pain, frequency, hematuria, menstrual problem, urgency, vaginal bleeding and vaginal discharge. Musculoskeletal:  Negative for arthralgias, back pain, myalgias and neck pain. Skin:  Negative for rash and wound. Neurological:  Negative for syncope, weakness, numbness and headaches. Psychiatric/Behavioral:  Negative for self-injury and suicidal ideas. All other systems reviewed and are negative. Vitals:    08/21/22 0641   BP: 122/85   Pulse: 100   Resp: 16   Temp: 98 °F (36.7 °C)   SpO2: 99%            Physical Exam  Vitals and nursing note reviewed. Constitutional:       General: She is not in acute distress. Appearance: Normal appearance. She is well-developed. She is not diaphoretic. Comments: Pleasant, no acute distress. HENT:      Head: Normocephalic and atraumatic. Nose: Nose normal.   Eyes:      Extraocular Movements: Extraocular movements intact. Conjunctiva/sclera: Conjunctivae normal.      Pupils: Pupils are equal, round, and reactive to light.    Cardiovascular:      Rate and Rhythm: Normal rate and regular rhythm. Heart sounds: Normal heart sounds. Pulmonary:      Effort: Pulmonary effort is normal.      Breath sounds: Normal breath sounds. Chest:      Chest wall: Tenderness (mild without bony crepitus or deformity. No reproducible with flexion of chest wall musculature.) present. Abdominal:      General: There is no distension. Palpations: Abdomen is soft. Tenderness: There is no abdominal tenderness. Musculoskeletal:         General: No tenderness. Cervical back: Neck supple. Right lower leg: No edema. Left lower leg: No edema. Skin:     General: Skin is warm and dry. Neurological:      General: No focal deficit present. Mental Status: She is alert and oriented to person, place, and time. Cranial Nerves: No cranial nerve deficit. Sensory: No sensory deficit. Motor: No weakness. Coordination: Coordination normal.        MDM    71-year-old female presents with several days of chest pain/tightness and history concerning for ACS. Labs returned showing negative D-dimer [place] [address]. Mild leukocytosis of 12.3, no anemia, significant low NA at 119, CL 85, possibly secondary to polydipsia of water recently, normal K, creatinine and bicarb and anion gap. Given IV fluid bolus for hyponatremia  Repeat EKG was done after lab returned and showed worsening anterior ST elevation. STEMI was called. She was given dose of aspirin, heparin bolus and dose of fentanyl for pain. Cardiology was consulted, Dr. Sujey Rueda who saw the patient at the bedside and help facilitate taking patient to Cath Lab for intervention.       Total critical care time (not including time spent performing separately reportable procedures): 50 minutes    Procedures    639 EKG shows sinus tachycardia with a rate of 106 beats a minute with nonspecific ST and T wave abnormalities but no clear acute ST elevation or depression suggestive of ischemia. 801 EKG repeat shows increasing ST elevation anteriorly particularly in V2, V3, V4, V5. Now with T wave flattening inferiorly without clear reciprocal depression.   Rate of 105 bpm.

## 2022-08-21 NOTE — PROGRESS NOTES
Sodium overcorrection  Stopped NS   Started D5w at 75   One dose of desmopressin   Check NA at 6 pm  and call on call

## 2022-08-22 ENCOUNTER — APPOINTMENT (OUTPATIENT)
Dept: NON INVASIVE DIAGNOSTICS | Age: 57
DRG: 174 | End: 2022-08-22
Attending: STUDENT IN AN ORGANIZED HEALTH CARE EDUCATION/TRAINING PROGRAM
Payer: COMMERCIAL

## 2022-08-22 ENCOUNTER — TRANSCRIBE ORDER (OUTPATIENT)
Dept: CARDIAC REHAB | Age: 57
End: 2022-08-22

## 2022-08-22 DIAGNOSIS — Z95.5 STENTED CORONARY ARTERY: ICD-10-CM

## 2022-08-22 DIAGNOSIS — I21.3 MYOCARDIAL NECROSIS SYNDROME (HCC): Primary | ICD-10-CM

## 2022-08-22 PROBLEM — I50.21 ACUTE HFREF (HEART FAILURE WITH REDUCED EJECTION FRACTION) (HCC): Status: ACTIVE | Noted: 2022-08-22

## 2022-08-22 LAB
ALBUMIN SERPL-MCNC: 2.9 G/DL (ref 3.5–5)
ALBUMIN SERPL-MCNC: 3.7 G/DL (ref 3.5–5)
ANION GAP SERPL CALC-SCNC: 10 MMOL/L (ref 5–15)
ANION GAP SERPL CALC-SCNC: 8 MMOL/L (ref 5–15)
ANION GAP SERPL CALC-SCNC: 8 MMOL/L (ref 5–15)
ANION GAP SERPL CALC-SCNC: 9 MMOL/L (ref 5–15)
APTT PPP: 33.5 SEC (ref 22.1–31)
BNP SERPL-MCNC: 7414 PG/ML
BUN SERPL-MCNC: 5 MG/DL (ref 6–20)
BUN SERPL-MCNC: 6 MG/DL (ref 6–20)
BUN SERPL-MCNC: 6 MG/DL (ref 6–20)
BUN SERPL-MCNC: 8 MG/DL (ref 6–20)
BUN/CREAT SERPL: 11 (ref 12–20)
BUN/CREAT SERPL: 15 (ref 12–20)
BUN/CREAT SERPL: 16 (ref 12–20)
BUN/CREAT SERPL: 9 (ref 12–20)
CALCIUM SERPL-MCNC: 7.3 MG/DL (ref 8.5–10.1)
CALCIUM SERPL-MCNC: 7.8 MG/DL (ref 8.5–10.1)
CALCIUM SERPL-MCNC: 8 MG/DL (ref 8.5–10.1)
CALCIUM SERPL-MCNC: 8.1 MG/DL (ref 8.5–10.1)
CHLORIDE SERPL-SCNC: 86 MMOL/L (ref 97–108)
CHLORIDE SERPL-SCNC: 89 MMOL/L (ref 97–108)
CHLORIDE SERPL-SCNC: 89 MMOL/L (ref 97–108)
CHLORIDE SERPL-SCNC: 92 MMOL/L (ref 97–108)
CO2 SERPL-SCNC: 24 MMOL/L (ref 21–32)
CO2 SERPL-SCNC: 26 MMOL/L (ref 21–32)
CORTIS AM PEAK SERPL-MCNC: 28.2 UG/DL (ref 4.3–22.45)
CREAT SERPL-MCNC: 0.38 MG/DL (ref 0.55–1.02)
CREAT SERPL-MCNC: 0.53 MG/DL (ref 0.55–1.02)
CREAT SERPL-MCNC: 0.53 MG/DL (ref 0.55–1.02)
CREAT SERPL-MCNC: 0.58 MG/DL (ref 0.55–1.02)
ERYTHROCYTE [DISTWIDTH] IN BLOOD BY AUTOMATED COUNT: 12.5 % (ref 11.5–14.5)
GLUCOSE SERPL-MCNC: 119 MG/DL (ref 65–100)
GLUCOSE SERPL-MCNC: 126 MG/DL (ref 65–100)
GLUCOSE SERPL-MCNC: 141 MG/DL (ref 65–100)
GLUCOSE SERPL-MCNC: 95 MG/DL (ref 65–100)
HCT VFR BLD AUTO: 33.6 % (ref 35–47)
HGB BLD-MCNC: 12.4 G/DL (ref 11.5–16)
MCH RBC QN AUTO: 32.9 PG (ref 26–34)
MCHC RBC AUTO-ENTMCNC: 36.9 G/DL (ref 30–36.5)
MCV RBC AUTO: 89.1 FL (ref 80–99)
NRBC # BLD: 0 K/UL (ref 0–0.01)
NRBC BLD-RTO: 0 PER 100 WBC
PHOSPHATE SERPL-MCNC: 2.4 MG/DL (ref 2.6–4.7)
PHOSPHATE SERPL-MCNC: 2.8 MG/DL (ref 2.6–4.7)
PLATELET # BLD AUTO: 228 K/UL (ref 150–400)
PMV BLD AUTO: 9.7 FL (ref 8.9–12.9)
POTASSIUM SERPL-SCNC: 3.3 MMOL/L (ref 3.5–5.1)
POTASSIUM SERPL-SCNC: 3.4 MMOL/L (ref 3.5–5.1)
POTASSIUM SERPL-SCNC: 3.5 MMOL/L (ref 3.5–5.1)
POTASSIUM SERPL-SCNC: 3.7 MMOL/L (ref 3.5–5.1)
RBC # BLD AUTO: 3.77 M/UL (ref 3.8–5.2)
SODIUM SERPL-SCNC: 119 MMOL/L (ref 136–145)
SODIUM SERPL-SCNC: 123 MMOL/L (ref 136–145)
SODIUM SERPL-SCNC: 123 MMOL/L (ref 136–145)
SODIUM SERPL-SCNC: 124 MMOL/L (ref 136–145)
THERAPEUTIC RANGE,PTTT: ABNORMAL SECS (ref 58–77)
WBC # BLD AUTO: 6.5 K/UL (ref 3.6–11)

## 2022-08-22 PROCEDURE — 74011250637 HC RX REV CODE- 250/637: Performed by: NURSE PRACTITIONER

## 2022-08-22 PROCEDURE — 99222 1ST HOSP IP/OBS MODERATE 55: CPT | Performed by: PSYCHIATRY & NEUROLOGY

## 2022-08-22 PROCEDURE — 74011000250 HC RX REV CODE- 250: Performed by: STUDENT IN AN ORGANIZED HEALTH CARE EDUCATION/TRAINING PROGRAM

## 2022-08-22 PROCEDURE — 82533 TOTAL CORTISOL: CPT

## 2022-08-22 PROCEDURE — 80048 BASIC METABOLIC PNL TOTAL CA: CPT

## 2022-08-22 PROCEDURE — 65620000000 HC RM CCU GENERAL

## 2022-08-22 PROCEDURE — 74011250636 HC RX REV CODE- 250/636: Performed by: STUDENT IN AN ORGANIZED HEALTH CARE EDUCATION/TRAINING PROGRAM

## 2022-08-22 PROCEDURE — 85027 COMPLETE CBC AUTOMATED: CPT

## 2022-08-22 PROCEDURE — 83880 ASSAY OF NATRIURETIC PEPTIDE: CPT

## 2022-08-22 PROCEDURE — 80069 RENAL FUNCTION PANEL: CPT

## 2022-08-22 PROCEDURE — 74011250637 HC RX REV CODE- 250/637: Performed by: INTERNAL MEDICINE

## 2022-08-22 PROCEDURE — 74011250637 HC RX REV CODE- 250/637: Performed by: PSYCHIATRY & NEUROLOGY

## 2022-08-22 PROCEDURE — P9047 ALBUMIN (HUMAN), 25%, 50ML: HCPCS | Performed by: INTERNAL MEDICINE

## 2022-08-22 PROCEDURE — C8929 TTE W OR WO FOL WCON,DOPPLER: HCPCS

## 2022-08-22 PROCEDURE — 74011250637 HC RX REV CODE- 250/637: Performed by: STUDENT IN AN ORGANIZED HEALTH CARE EDUCATION/TRAINING PROGRAM

## 2022-08-22 PROCEDURE — 74011250636 HC RX REV CODE- 250/636: Performed by: INTERNAL MEDICINE

## 2022-08-22 PROCEDURE — 36415 COLL VENOUS BLD VENIPUNCTURE: CPT

## 2022-08-22 PROCEDURE — 85730 THROMBOPLASTIN TIME PARTIAL: CPT

## 2022-08-22 RX ORDER — ALBUMIN HUMAN 250 G/1000ML
25 SOLUTION INTRAVENOUS EVERY 6 HOURS
Status: COMPLETED | OUTPATIENT
Start: 2022-08-22 | End: 2022-08-22

## 2022-08-22 RX ORDER — 3% SODIUM CHLORIDE 3 G/100ML
25 INJECTION, SOLUTION INTRAVENOUS CONTINUOUS
Status: DISPENSED | OUTPATIENT
Start: 2022-08-22 | End: 2022-08-22

## 2022-08-22 RX ORDER — OXCARBAZEPINE 150 MG/1
300 TABLET, FILM COATED ORAL
Status: DISCONTINUED | OUTPATIENT
Start: 2022-08-22 | End: 2022-08-26 | Stop reason: HOSPADM

## 2022-08-22 RX ORDER — LEVETIRACETAM 500 MG/1
500 TABLET ORAL 2 TIMES DAILY
Status: DISCONTINUED | OUTPATIENT
Start: 2022-08-22 | End: 2022-08-26 | Stop reason: HOSPADM

## 2022-08-22 RX ADMIN — ALBUMIN (HUMAN) 25 G: 0.25 INJECTION, SOLUTION INTRAVENOUS at 11:49

## 2022-08-22 RX ADMIN — POTASSIUM BICARBONATE 20 MEQ: 782 TABLET, EFFERVESCENT ORAL at 02:00

## 2022-08-22 RX ADMIN — ASPIRIN 81 MG CHEWABLE TABLET 81 MG: 81 TABLET CHEWABLE at 08:44

## 2022-08-22 RX ADMIN — CLOPIDOGREL BISULFATE 75 MG: 75 TABLET ORAL at 08:44

## 2022-08-22 RX ADMIN — ALBUMIN (HUMAN) 25 G: 0.25 INJECTION, SOLUTION INTRAVENOUS at 08:43

## 2022-08-22 RX ADMIN — PHENYLEPHRINE HYDROCHLORIDE 40 MCG/MIN: 10 INJECTION INTRAVENOUS at 21:00

## 2022-08-22 RX ADMIN — PERFLUTREN 1.5 ML: 6.52 INJECTION, SUSPENSION INTRAVENOUS at 17:00

## 2022-08-22 RX ADMIN — ENOXAPARIN SODIUM 40 MG: 100 INJECTION SUBCUTANEOUS at 15:30

## 2022-08-22 RX ADMIN — ATORVASTATIN CALCIUM 80 MG: 40 TABLET, FILM COATED ORAL at 08:44

## 2022-08-22 RX ADMIN — OXCARBAZEPINE 300 MG: 150 TABLET, FILM COATED ORAL at 21:14

## 2022-08-22 RX ADMIN — POTASSIUM & SODIUM PHOSPHATES POWDER PACK 280-160-250 MG 2 PACKET: 280-160-250 PACK at 09:30

## 2022-08-22 RX ADMIN — SODIUM CHLORIDE, PRESERVATIVE FREE 10 ML: 5 INJECTION INTRAVENOUS at 21:19

## 2022-08-22 RX ADMIN — SODIUM CHLORIDE, PRESERVATIVE FREE 10 ML: 5 INJECTION INTRAVENOUS at 14:49

## 2022-08-22 RX ADMIN — PHENYLEPHRINE HYDROCHLORIDE 30 MCG/MIN: 10 INJECTION INTRAVENOUS at 05:23

## 2022-08-22 RX ADMIN — SODIUM CHLORIDE 25 ML/HR: 3 INJECTION, SOLUTION INTRAVENOUS at 14:46

## 2022-08-22 RX ADMIN — PHENOBARBITAL 324 MG: 64.8 TABLET ORAL at 21:13

## 2022-08-22 RX ADMIN — LEVETIRACETAM 500 MG: 500 TABLET, FILM COATED ORAL at 11:49

## 2022-08-22 NOTE — PROGRESS NOTES
0730: Bedside shift change report given to Ambar Brush RN (oncoming nurse) by Caity Orlando RN (offgoing nurse). Report included the following information SBAR, Kardex, ED Summary, OR Summary, Procedure Summary, Intake/Output, MAR, Recent Results, Cardiac Rhythm NSR/ST, and Dual Neuro Assessment. 1000: Multidisciplinary rounds were held 8/22/2022. Today's plan/goal includes (but not limited to): increasing BP, weaning off Jefferson, comfort, no pain, speak with Neurology/MDs concerning trileptal, brin    1015: Dr Araceli Nava, Neurology, speaking with and assessing pt @ the bedside. 1930: Bedside shift change report given to GIRISH Ibarra (oncoming nurse) by Ambar Brush RN (offgoing nurse). Report included the following information SBAR, Kardex, ED Summary, OR Summary, Procedure Summary, Intake/Output, MAR, Recent Results, Cardiac Rhythm NSR/ST, Alarm Parameters , and Dual Neuro Assessment.

## 2022-08-22 NOTE — PROGRESS NOTES
[facility]   [address] [address], [address] [facility]  Phone: [phone]   [phone]    [email or web address]     Nephrology Progress Note    Patient Name : Karla Bennett      : 1965     MRN : [id number]  Date of Admission : 2022  Date of Servive : 22    CC:  Follow up for Hyponatremia        Assessment and Plan   Hyponatremia :  -  duration : unknown. No prior labs in many years, does not have PCP   - Etiology : Urine Osm : inappropriately high --> suggesting SIADH picture and likely culprit is Trileptal   - she has not labs in years to know if there is chronic mild Hyponatremia from Trileptal. She is very concerned about holding trileptal and took home medication last night on her own   - Neurology has been consulted to help w/ anti epileptic management   - she look euvolemic on exam without signs of overt CHF   - Best to avoid Isotonic fluids --for volume and risk for worsening Hyponatremia w/ Urine osm > 700   - Na correcting at appropriate rate w/ 3% saline   - labs now and every 6 hrs   - prior hx of Hypothyroidism : TFT ok now. Check Cortisol       STEMI :  - S/P PCI x 2   - EF 25%, elevated LVEDP on Cath   - hypotension : on kerry   - per cards     Seizure Disorder  - f/b Dr Yayo Ying   - consult neurology     Hx of Hypothyroidism          Interval History:  Seen and examined. She had hard time understanding severity and significance of severe hyponatremia that she presented with   Does not have PCP and uses her Neurologist as PCP if needed  Has not had labs in many years   Has been on [address] since age 16 but Trileptal was added some 15  years ago   She had seen Dr Lisandra Valdez for Hypothyroidism in the remote past       Review of Systems: A comprehensive review of systems was negative except for that written in the HPI.     Current Medications:   Current Facility-Administered Medications   Medication Dose Route Frequency PHENYLephrine (MICHAEL-SYNEPHRINE) 30 mg in 0.9% sodium chloride 250 mL infusion   mcg/min IntraVENous TITRATE    albumin human 25% (BUMINATE) solution 25 g  25 g IntraVENous Q6H    sodium chloride (NS) flush 5-40 mL  5-40 mL IntraVENous Q8H    sodium chloride (NS) flush 5-40 mL  5-40 mL IntraVENous PRN    aspirin chewable tablet 81 mg  81 mg Oral DAILY    atorvastatin (LIPITOR) tablet 80 mg  80 mg Oral DAILY    metoprolol tartrate (LOPRESSOR) tablet 12.5 mg  12.5 mg Oral Q12H    enoxaparin (LOVENOX) injection 40 mg  40 mg SubCUTAneous Q24H    PHENobarbitaL (LUMINAL) tablet 324 mg  324 mg Oral QHS    clopidogreL (PLAVIX) tablet 75 mg  75 mg Oral DAILY    potassium, sodium phosphates (NEUTRA-PHOS) packet 2 Packet  2 Packet Oral QID      Allergies   Allergen Reactions    Dilantin [Phenytoin Sodium Extended] Rash       Objective:  Vitals:    Vitals:    08/22/22 0530 08/22/22 0600 08/22/22 0630 08/22/22 0700   BP: (!) 84/52 (!) 85/48 (!) 82/55 (!) 90/54   Pulse: 100 93 93 91   Resp: 16 14 20 18   Temp:       SpO2: 96% 97% 97% 96%   Weight:       Height:         Intake and Output:  No intake/output data recorded. 08/20 1901 - 08/22 0700  In: 907.2 [P.O.:240; I.V.:667.2]  Out: 1800 [Urine:1800]    Physical Examination:        General: NAD,Conversant   Neck:  Supple, no mass  Resp:  Lungs CTA B/L, no wheezing , normal respiratory effort  CV:  RRR,  no murmur or rub, LE edema  GI:  Soft, NT, + BS, no HS megaly  Neurologic:  Non focal  Psych:             AAO x 3 appropriate affect   Skin:  No Rash  :  Lentz + / -      []    High complexity decision making was performed  []    Patient is at high-risk of decompensation with multiple organ involvement    Lab Data Personally Reviewed: I have reviewed all the pertinent labs, microbiology data and radiology studies during assessment.     Recent Labs     08/22/22  0357 08/22/22  0035 08/21/22  1740 08/21/22  1228 08/21/22  0651   * 119* 117* 128* 119*   K 3.7 3.4* 3.5 2. 6* 3.6   CL 92* 86* 84* 97 85*   CO2 24 24 24 21 22   GLU 95 126* 146* 96 130*   BUN 6 6 6 4* 5*   CREA 0.38* 0.53* 0.64 0.35* 0.60   CA 7.3* 7.8* 8.1* 6.3* 8.8   MG  --   --   --   --  1.6   PHOS  --   --  2.1* 1.9*  --    ALB  --   --  2.8* 2.3* 3.5   ALT  --   --   --   --  33     Recent Labs     08/22/22  0357 08/21/22  0651   WBC 6.5 12.3*   HGB 12.4 14.6   HCT 33.6* 39.6    298     No results found for: SDES  No results found for: CULT          I have reviewed the flowsheets. Chart and Pertinent Notes have been reviewed. No change in PMH ,family and social history from Consult note.       Shay Galdamez [facility]

## 2022-08-22 NOTE — NURSE NAVIGATOR
HEART FAILURE NURSE NAVIGATOR NOTE  [address]    Patient chart was reviewed by Heart Failure (HF) Nurse Navigators for compliance of prescribed treatment with guidelines directed medical therapy (GDMT) and HF database completed. Please, review beneath recommendations for symptomatic patients with HF with Reduced Ejection Fraction ? 40% (HFrEF) for your consideration when taking care of this patient. Current Medical Therapy:    Name Ange KILLIAN 1965   LVEF 25/30 % (cardiac cath)   NYHA Functional Class Documentation requested   ARNi/ACEi/ARB    Beta-blocker    Aldosterone Antagonist    SGLT2 inhibitor    Hydralazine/Isosorbide Dinitrate    Consulting Cardiologist: Dr. Greyson Priest     Per Dr. Blanka Leiva note, Bradley Graf to start GDMT due to hypotension\". Recommendations:    Please, add the following GDMT for HFrEF ? 40% [Class 1] or document in discharge summary/progress note why patient cannot take the medication:  ARNi/ACEi or ARB  Beta-blockers (carvedilol, sustained-release metoprolol succinate or bisoprolol)  Aldosterone antagonists GFR > 30 and K< 5  SGLT2 inhibitor  Hydralazine/Isosorbide dinitrate for  Americans with Class III/IV symptoms  Adjust diuretic dose at discharge if hospitalized for volume overload    Consider adding the following GDMT for HFrEF ? 40%, if appropriate [Class 2b]:   Ivabradine for patients on maximally tolerated beta-blocker dose in order to achieve HR 70-80bpm  Digoxin, goal level 0.5-0.9  Polyunsaturated fatty acids  Vericuguat  For patient with hyperkalemia while on RAASi > 5.5, consider adding potassium binders (patiromer, sodium zirconium cycosilicate)    Note: the following medications may be potentially harmful in heart failure [Class 3]:  Calcium channel blockers (doxazosin, diltiazem, verapamil, nifedipine)  Antiarrhythmics (flecanide, disopyrimide, sotalol, dronedarone)  Diabetes medications (thiasolidinediones, saxagliptin, alogliptin)  NSAIDs and LOPEZ 2 inhibitors    Consider vaccinations for respiratory illnesses (flu, pneumonia, covid) [Class 2b]    For eligible patients with LVEF < 35% consider discharge with wearable defibrillation [Class 2b] and/or referral for ICD implantation [Class 1] for prevention of sudden cardiac death. If your patient is a woman in childbearing age (16-50 years). If appropriate, please,  patient to speak to provider when planning to become pregnant due to teratogenic effect of some HF medications and increased risk/potential contraindication of pregnancy [Class 1]    Due to severely reduced LVEF < 25% and/or recurrent hospitalizations this patient may benefit from referral to Advanced Heart Failure Program to assess suitability for advanced therapies, such as left-ventricular assist device, heart transplantation, palliative inotropes or palliation [Class 1]. To obtain in-patient consultation or refer to [address] [place] [address], call [phone]    Patient Education:     Teach back in heart failure education provided, including information about medical therapy, lifestyle modifications, diet and fluid restrictions, physical activity. Educational resources provided: Living with Heart Failure booklet; Signs/Symptoms magnet; Weight Calendar; [facility] flyer; Preparation for Successful Discharge Checklist.  Information provided about HF support group. Heart failure avoiding triggers on discharge instructions. Plan for Transitional Care:    Post discharge follow up phone call to be made within 48-72 hours of discharge. Patient will follow-up with PCP. Patient will follow-up with Primary Cardiologist.  Obstructive sleep apnea screening done and patient was referred to Sleep Medicine. Referral/follow-up with Cardiac Rehabilitation.   Referral/follow-up with Advanced Heart Failure Specialist.  Referral/follow-up with Palliative Care Specialist.      Heart Failure Nurse Navigator  Phone: [phone]  /  [phone]    *Recommendations listed above are based on 2022 AHA/ACC/HFSA Guideline for the Management of Heart Failure: A Report of the [address] on Clinical Practice Guidelines. Circulation 2022; [id number]. and 2017 AHA/ACC/HRS guideline for management of patients with ventricular arrhythmias and the prevention of sudden cardiac death: A Report of the [facility]/American Heart Association Task Force on Clinical Practice Guidelines and the Heart Rhythm Society.  Heart Rhythm, Vol 15, No 10, October 2018 *Class of Recommendation: Class 1 (strong), Class 2a (moderate), Class 2b (weak), Class 3 (not recommended, potentially harmful)

## 2022-08-22 NOTE — PROGRESS NOTES
Cardiology Progress Note  2022    Admit Date: 2022  Admit Diagnosis: STEMI (ST elevation myocardial infarction) ([facility] [I21.3]  CC:  STEMI    STEMI - s/p PCI of proximal LAD which was culprit. PCI of hemodynamically significant LCx to OM1 lesion as well  - continue aspirin 81 mg daily and plavix 75 mg daily  - high intensity atorvastatin  - hold metoprolol due to hypotension  - wean phenylephrine for MAP > 65  - discussed with Dr. Kamran Buck, will give albumin     2. Acute HFrEF - LVEF severely reduced on ventriculogram. LVEDP moderately elevated 25-30 mmHg. Stable respiratory status and appears euvolemic.   - obtain echo  - unable to start GDMT due to hypotension     3. Severe hyponatremia   - appreciate nephrology input     4. Seizure d/o  - continue phenobarbital  - hold oxcarbazapine due to hyponatremia. Neurology consult for alternative recommendations        Hospital problem list     Active Problems:    STEMI (ST elevation myocardial infarction) ([facility] (2022)                                                          Subjective:     Feels well. No chest pain or dyspnea. Bruising at the right radial access site that is getting better. Started on phenylephrine overnight    ROS: All other systems reviewed and were negative other than mentioned above. No change in family and social history from H&P/Consult note.     Objective:    Physical Exam:  24 hr VS reviewed, overall VSSAF  Temp (24hrs), Av.9 °F (36.6 °C), Min:97.5 °F (36.4 °C), Max:98.2 °F (36.8 °C)    Patient Vitals for the past 8 hrs:   Pulse   22 0700 91   22 0630 93   22 0600 93   22 0530 100   22 0500 93   22 0400 (!) 103   22 0300 (!) 101   22 0200 (!) 102   22 0100 97   22 0000 (!) 110    Patient Vitals for the past 8 hrs:   Resp   22 0700 18   22 0630 20   22 0600 14   22 0530 16 08/22/22 0500 16   08/22/22 0400 19   08/22/22 0300 18   08/22/22 0200 18   08/22/22 0100 26   08/22/22 0000 28    Patient Vitals for the past 8 hrs:   BP   08/22/22 0700 (!) 90/54   08/22/22 0630 (!) 82/55   08/22/22 0600 (!) 85/48   08/22/22 0530 (!) 84/52   08/22/22 0500 (!) 75/43   08/22/22 0400 (!) 98/56   08/22/22 0300 (!) 91/58   08/22/22 0200 (!) 83/57   08/22/22 0100 (!) 83/54   08/22/22 0000 (!) 92/59          Intake/Output Summary (Last 24 hours) at 8/22/2022 0719  Last data filed at 8/22/2022 0600  Gross per 24 hour   Intake 907.17 ml   Output 1800 ml   Net -892.83 ml       General: resting comfortably in no distress  HEENT: sclera anicteric, moist mucous membranes  Neck: supple, no JVD or HJR  CV: regular rate and rhythm, normal S1 and S2, no murmurs, rubs or gallops,   Lungs: no respiratory distress, lungs clear to auscultation without wheezing or rales  Abdomen: + bowel sounds, soft, non distended, non tender  MSK: no lower extremity edema, right radial access site with small hematoma, good cap refill, sensation inact  Skin: warm to touch  Neuro: alert and oriented, answered questions appropriately  Psych: normal mood and affect    Data Review:  Lab results reviewed as noted below. Current medications reviewed as noted below. Telemetry independently reviewed : [x]  sinus    []  chronic afib     []  par afib    []  NSVT    ECG independently reviewed: []  NSR    []  no significant changes   []  no new ECG provided for review    No results for input(s): PH, PCO2, PO2 in the last 72 hours. No results for input(s): CPK, CKMB in the last 72 hours.     No lab exists for component: TROPONINI  Recent Labs     08/22/22  0357 08/22/22  0035 08/21/22  1740 08/21/22  1228 08/21/22  0651   * 119* 117* 128* 119*   K 3.7 3.4* 3.5 2.6* 3.6   CL 92* 86* 84* 97 85*   CO2 24 24 24 21 22   BUN 6 6 6 4* 5*   CREA 0.38* 0.53* 0.64 0.35* 0.60   GLU 95 126* 146* 96 130*   PHOS  --   -- 2. 1* 1.9*  --    CA 7.3* 7.8* 8.1* 6.3* 8.8   ALB  --   --  2.8* 2.3* 3.5   WBC 6.5  --   --   --  12.3*   HGB 12.4  --   --   --  14.6   HCT 33.6*  --   --   --  39.6     --   --   --  298     Recent Labs     08/21/22  1740 08/21/22  1228 08/21/22  0651   AP  --   --  87   TBILI  --   --  0.7   TP  --   --  7.3   ALB 2.8* 2.3* 3.5   GLOB  --   --  3.8     Recent Labs     08/22/22  0357   APTT 33.5*      No results for input(s): FE, TIBC, PSAT, FERR in the last 72 hours. No results found for: GLUCPOC    Current Facility-Administered Medications   Medication Dose Route Frequency    PHENYLephrine (MICHAEL-SYNEPHRINE) 30 mg in 0.9% sodium chloride 250 mL infusion   mcg/min IntraVENous TITRATE    albumin human 25% (BUMINATE) solution 25 g  25 g IntraVENous Q6H    sodium chloride (NS) flush 5-40 mL  5-40 mL IntraVENous Q8H    sodium chloride (NS) flush 5-40 mL  5-40 mL IntraVENous PRN    aspirin chewable tablet 81 mg  81 mg Oral DAILY    atorvastatin (LIPITOR) tablet 80 mg  80 mg Oral DAILY    [Held by provider] metoprolol tartrate (LOPRESSOR) tablet 12.5 mg  12.5 mg Oral Q12H    enoxaparin (LOVENOX) injection 40 mg  40 mg SubCUTAneous Q24H    PHENobarbitaL (LUMINAL) tablet 324 mg  324 mg Oral QHS    clopidogreL (PLAVIX) tablet 75 mg  75 mg Oral DAILY    potassium, sodium phosphates (NEUTRA-PHOS) packet 2 Packet  2 Packet Oral QID       Total critical care time - 45 minutes           MDM:  High  Risk of decompensation:  High    I personally spent the above critical care time. This is time spent at this critically ill patient's bedside / unit / floor actively involved in patient care as well as the coordination of care with consulting services, nurses and discussions with the patient's family. This does not include any procedural time which has been billed separately.     This patient has a critical illness or injury that is acutely impairing one or more vital organ systems such that there is a high probability of imminent or life threatening deterioration in the patient's condition. This patient requires high complexity medical decision making to assess, manipulate, and support vital organ system failure. After stabilization, this patient still requires management to prevent further life / organ threatening deterioration. Norman Marsh.  Mami Magallon, [address] [facility]  08/22/22

## 2022-08-22 NOTE — PROGRESS NOTES
1930: bedside report received from [facility]. 2040: 3% sodium chloride started. 0040: 3% stopped. Stat BMP sent. 0100: , Chelsi Aly NP paged. 0500: MAP 51, Dr. April Stein paged, orders for kerry received and kerry started. 0730:  Bedside report given to [facility]

## 2022-08-22 NOTE — CARDIO/PULMONARY
Cardiac Rehab: MI education folder, to bedside of Domingo Trevizo. Educated using teach back method. Reviewed MI diagnosis definition and purpose of intervention. Discussed risk factors for CAD to include the following: family history, elevated BMI, hyperlipidemia, hypertension, diabetes, stress, and smoking Discussed Heart Healthy/Low Sodium (2000 mg) diet. Reviewed the importance of medication compliance, the purpose of plavix and aspirin and potential side effects. Discussed follow up appointments with cardiologist, signs and symptoms of angina, and what to report to physician after discharge. Emphasized the value of cardiac rehab. Discussed Cardiac Rehab Program format, benefits, and encouraged enrollment to assist with risk modification and management. Areferral was placed for OP Cardiac Rehab and the contact information is on her AVS. Agreed to a call at home after discharge and would consider an abbreviated program.    Domingo Trevizo verbalized understanding with questions answered.   Steffany Ogden RN

## 2022-08-22 NOTE — CONSULTS
INPATIENT NEUROLOGY CONSULTATION  8/22/2022     Consulted by: Sander Rosado MD        Patient ID:  Rod Curtis  [id number]  89 y.o.  1965    CC: Epilepsy    HPI    Thien Potter is a 71-year-old woman with a long history of epilepsy over 40 years followed by Dr. George Deal in [facility] who was admitted for acute STEMI and underwent cardiac cath with stenting. She was also found to have hyponatremia. Neurology was consulted to visit the issue of her anticonvulsants particularly Trileptal.  She is currently on a regimen of phenobarbital at night and oxcarbazepine 600 mg at night for over 20 years and has not had a seizure in 18 years or so. She does tell me that before her current presentation she spent 4 weeks outside daily with little water and food intake decreased from her norm because she was working outside. She feels fine now. She is extremely worried about having a seizure breakthrough. ROS    No past medical history on file. No family history on file.   Social History     Socioeconomic History    Marital status:      Spouse name: Not on file    Number of children: Not on file    Years of education: Not on file    Highest education level: Not on file   Occupational History    Not on file   Tobacco Use    Smoking status: Not on file    Smokeless tobacco: Not on file   Substance and Sexual Activity    Alcohol use: Not on file    Drug use: Not on file    Sexual activity: Not on file   Other Topics Concern    Not on file   Social History Narrative    Not on file     Social Determinants of Health     Financial Resource Strain: Not on file   Food Insecurity: Not on file   Transportation Needs: Not on file   Physical Activity: Not on file   Stress: Not on file   Social Connections: Not on file   Intimate Partner Violence: Not on file   Housing Stability: Not on file     Current Facility-Administered Medications   Medication Dose Route Frequency    PHENYLephrine (MICHAEL-SYNEPHRINE) 30 mg in 0.9% sodium chloride 250 mL infusion   mcg/min IntraVENous TITRATE    albumin human 25% (BUMINATE) solution 25 g  25 g IntraVENous Q6H    OXcarbazepine (TRILEPTAL) tablet 300 mg  300 mg Oral QHS    levETIRAcetam (KEPPRA) tablet 500 mg  500 mg Oral BID    sodium chloride (NS) flush 5-40 mL  5-40 mL IntraVENous Q8H    sodium chloride (NS) flush 5-40 mL  5-40 mL IntraVENous PRN    aspirin chewable tablet 81 mg  81 mg Oral DAILY    atorvastatin (LIPITOR) tablet 80 mg  80 mg Oral DAILY    [Held by provider] metoprolol tartrate (LOPRESSOR) tablet 12.5 mg  12.5 mg Oral Q12H    enoxaparin (LOVENOX) injection 40 mg  40 mg SubCUTAneous Q24H    PHENobarbitaL (LUMINAL) tablet 324 mg  324 mg Oral QHS    clopidogreL (PLAVIX) tablet 75 mg  75 mg Oral DAILY     Allergies   Allergen Reactions    Dilantin [Phenytoin Sodium Extended] Rash       Visit Vitals  BP (!) 92/58   Pulse 93   Temp 97.5 °F (36.4 °C)   Resp 17   Ht 5' 9\" (1.753 m)   Wt 137 lb 12.6 oz (62.5 kg)   SpO2 98%   BMI 20.35 kg/m²     Physical Exam  Neurologic Exam         Lab Results   Component Value Date/Time    WBC 6.5 08/22/2022 03:57 AM    HGB 12.4 08/22/2022 03:57 AM    HCT 33.6 (L) 08/22/2022 03:57 AM    PLATELET 725 89/58/4185 03:57 AM    MCV 89.1 08/22/2022 03:57 AM     Lab Results   Component Value Date/Time    Glucose 141 (H) 08/22/2022 08:00 AM    LDL, calculated 152 (H) 08/21/2022 12:28 PM    Creatinine 0.58 08/22/2022 08:00 AM      Lab Results   Component Value Date/Time    Cholesterol, total 221 (H) 08/21/2022 12:28 PM    HDL Cholesterol 57 08/21/2022 12:28 PM    LDL, calculated 152 (H) 08/21/2022 12:28 PM    Triglyceride 60 08/21/2022 12:28 PM    CHOL/HDL Ratio 3.9 08/21/2022 12:28 PM     Lab Results   Component Value Date/Time    ALT (SGPT) 33 08/21/2022 06:51 AM    Alk.  phosphatase 87 08/21/2022 06:51 AM    Bilirubin, total 0.7 08/21/2022 06:51 AM    Albumin 2.9 (L) 08/22/2022 08:00 AM    Protein, total 7.3 08/21/2022 06:51 AM    PLATELET 910 16/00/5030 03:57 AM        CT Results (maximum last 3): No results found for this or any previous visit. MRI Results (maximum last 3): Results from [facility] encounter on 01/21/09    MRI BREAST BILATERAL W AND W/O CONT    Narrative  Final Report          ICD Codes / Adm. Diagnosis:    /   ABNL MAMMO  Examination:  BREAST BILAT MRI Josué DENT CON  - [id number] - Jan 21 2009  8:40AM    Accession No:  [id number]  Reason:  ABNL MAMMO      REPORT:  ADDITIONAL HISTORY:  59-year-old female with dense breasts mammographically,  fluctuating left outer breast mass and architectural distortion on  mammography in the left outer breast. No definite sonographic correlate. CONTRAST:     13 mL Magnevist.    ANESTHESIA: None. PROCEDURE: Bilateral breast MRI was performed using a dedicated breast coil  in the prone position. Pre- and postcontrast axial and sagittal T1 and  T2-weighted images were performed. These were analyzed using CAD analysis,  digital subtraction, enhancement curves and 2 and 3 dimensional  reconstructions. FINDINGS: Background parenchymal enhancement is mild to moderate. There are  numerous foci of incidental delayed enhancement throughout both breasts. In the right breast at 5:00, there is linear branching high signal intensity  on T1-weighted both pre- and postgadolinium, but with delayed mild  enhancement. There is no dynamic enhancement. In the right breast at 12:00,  a 9 mm oval mass shows delayed enhancement, lies adjacent to a blood vessel  and is also relatively high signal on T1-weighting pregadolinium. There is  no high T2 signal at either of these locations. , and no mammographic  correlate. In the left breast, there is no dominant abnormality of morphology or signal  intensity. No lymphadenopathy suspected. IMPRESSION:  1. Right BI-RADS 3, probably benign. Left BI-RADS 2, benign.   2. Right breast: Focal abnormalities delayed enhancement at 5:00 and at  12:00, felt to most likely be benign. Followup MRI is recommended in 6  months, however, to confirm stability. .  3. Left breast: No MRI abnormality to correspond with focal palpable  abnormality and architectural distortion mammographically in the left outer  breast. Continued close followup by breast self-examination and physical  examination is recommended, with repeat imaging as appropriate. 4. A summary portfolio has been created for reference and is available in  PACS. A negative breast MRI examination speaks strongly against invasive cancer  down to a detection threshold of 3 to 5 mm, but may not detect some lower  grade or in situ carcinomas. Therefore, routine clinical and mammographic  followup are recommended. Interpreting/Reading Doctor: Manjula Logan ([id number])  Transcribed: n/a on 01/21/2009  Approved: Manjula Logan ([id number])  01/21/2009        Distribution:  Attending Doctor: Janie Centeno  Alternate Doctor: Janie Centeno      VAS/US/Carotid Doppler Results (maximum last 3): No results found for this or any previous visit. PET Results (maximum last 3): No results found for this or any previous visit. Assessment and Plan        66-year-old woman who has epilepsy long-term currently very well controlled on her regimen of phenobarbital and oxcarbazepine. I do appreciate the concern for hyponatremia given her medication. After long discussion with her at the bedside we are going to reduce oxcarbazepine to 300 mg at bedtime, continue phenobarbital as is, and add low-dose Keppra. It would not be prudent to stop oxcarbazepine without some type of controlled taper process. I have reduced it to 50% of her normal dose. I would recommend she remain on this triple AED regimen and then once medically cleared to be discharged she should follow-up with her neurologist to discuss further tapering as appropriate. EEGs not indicated. I do not need any neuroimaging.   I have no restrictions on her driving from a neuro perspective since she has been well controlled. I will sign off. Please call if needed. This clinical note was dictated with an electronic dictation software that can make unintentional errors. If there are any questions, please contact me directly for clarification.       [address] [facility],   NEUROLOGIST  Diplomate IVETTE  8/22/2022

## 2022-08-22 NOTE — DISCHARGE INSTRUCTIONS
Please follow up with your neurologist for further weaning of Trileptal.    Limit fluid intake to 2 liters a day to maintain your sodium levels. Follow up with cardiology and nephrology in 2 weeks.

## 2022-08-23 ENCOUNTER — APPOINTMENT (OUTPATIENT)
Dept: GENERAL RADIOLOGY | Age: 57
DRG: 174 | End: 2022-08-23
Attending: INTERNAL MEDICINE
Payer: COMMERCIAL

## 2022-08-23 LAB
ALBUMIN SERPL-MCNC: 3 G/DL (ref 3.5–5)
ALBUMIN SERPL-MCNC: 3.2 G/DL (ref 3.5–5)
ALBUMIN SERPL-MCNC: 3.3 G/DL (ref 3.5–5)
ALBUMIN SERPL-MCNC: 3.4 G/DL (ref 3.5–5)
ALBUMIN SERPL-MCNC: 3.6 G/DL (ref 3.5–5)
ALBUMIN/GLOB SERPL: 1.3 {RATIO} (ref 1.1–2.2)
ALP SERPL-CCNC: 60 U/L (ref 45–117)
ALT SERPL-CCNC: 20 U/L (ref 12–78)
ANION GAP SERPL CALC-SCNC: 11 MMOL/L (ref 5–15)
ANION GAP SERPL CALC-SCNC: 11 MMOL/L (ref 5–15)
ANION GAP SERPL CALC-SCNC: 7 MMOL/L (ref 5–15)
ANION GAP SERPL CALC-SCNC: 8 MMOL/L (ref 5–15)
AST SERPL-CCNC: 58 U/L (ref 15–37)
BILIRUB DIRECT SERPL-MCNC: 0.1 MG/DL (ref 0–0.2)
BILIRUB SERPL-MCNC: 0.4 MG/DL (ref 0.2–1)
BUN SERPL-MCNC: 4 MG/DL (ref 6–20)
BUN SERPL-MCNC: 4 MG/DL (ref 6–20)
BUN SERPL-MCNC: 5 MG/DL (ref 6–20)
BUN SERPL-MCNC: 7 MG/DL (ref 6–20)
BUN/CREAT SERPL: 11 (ref 12–20)
BUN/CREAT SERPL: 14 (ref 12–20)
BUN/CREAT SERPL: 8 (ref 12–20)
BUN/CREAT SERPL: 9 (ref 12–20)
CALCIUM SERPL-MCNC: 7.2 MG/DL (ref 8.5–10.1)
CALCIUM SERPL-MCNC: 8 MG/DL (ref 8.5–10.1)
CALCIUM SERPL-MCNC: 8.2 MG/DL (ref 8.5–10.1)
CALCIUM SERPL-MCNC: 8.6 MG/DL (ref 8.5–10.1)
CHLORIDE SERPL-SCNC: 104 MMOL/L (ref 97–108)
CHLORIDE SERPL-SCNC: 89 MMOL/L (ref 97–108)
CHLORIDE SERPL-SCNC: 93 MMOL/L (ref 97–108)
CHLORIDE SERPL-SCNC: 98 MMOL/L (ref 97–108)
CO2 SERPL-SCNC: 23 MMOL/L (ref 21–32)
CO2 SERPL-SCNC: 23 MMOL/L (ref 21–32)
CO2 SERPL-SCNC: 24 MMOL/L (ref 21–32)
CO2 SERPL-SCNC: 25 MMOL/L (ref 21–32)
CREAT SERPL-MCNC: 0.44 MG/DL (ref 0.55–1.02)
CREAT SERPL-MCNC: 0.44 MG/DL (ref 0.55–1.02)
CREAT SERPL-MCNC: 0.5 MG/DL (ref 0.55–1.02)
CREAT SERPL-MCNC: 0.52 MG/DL (ref 0.55–1.02)
ECHO AO ROOT DIAM: 2.9 CM
ECHO AO ROOT INDEX: 1.65 CM/M2
ECHO AV PEAK GRADIENT: 3 MMHG
ECHO AV PEAK VELOCITY: 0.9 M/S
ECHO AV VELOCITY RATIO: 0.78
ECHO EST RA PRESSURE: 15 MMHG
ECHO LA DIAMETER INDEX: 1.76 CM/M2
ECHO LA DIAMETER: 3.1 CM
ECHO LA TO AORTIC ROOT RATIO: 1.07
ECHO LV E' LATERAL VELOCITY: 5 CM/S
ECHO LV E' SEPTAL VELOCITY: 6 CM/S
ECHO LV EDV A2C: 74 ML
ECHO LV EDV A4C: 63 ML
ECHO LV EDV BP: 69 ML (ref 56–104)
ECHO LV EDV INDEX A4C: 36 ML/M2
ECHO LV EDV INDEX BP: 39 ML/M2
ECHO LV EDV NDEX A2C: 42 ML/M2
ECHO LV EJECTION FRACTION A2C: 29 %
ECHO LV EJECTION FRACTION A4C: 39 %
ECHO LV EJECTION FRACTION BIPLANE: 32 % (ref 55–100)
ECHO LV ESV A2C: 53 ML
ECHO LV ESV A4C: 38 ML
ECHO LV ESV BP: 47 ML (ref 19–49)
ECHO LV ESV INDEX A2C: 30 ML/M2
ECHO LV ESV INDEX A4C: 22 ML/M2
ECHO LV ESV INDEX BP: 27 ML/M2
ECHO LV FRACTIONAL SHORTENING: 18 % (ref 28–44)
ECHO LV INTERNAL DIMENSION DIASTOLE INDEX: 2.56 CM/M2
ECHO LV INTERNAL DIMENSION DIASTOLIC: 4.5 CM (ref 3.9–5.3)
ECHO LV INTERNAL DIMENSION SYSTOLIC INDEX: 2.1 CM/M2
ECHO LV INTERNAL DIMENSION SYSTOLIC: 3.7 CM
ECHO LV IVSD: 0.8 CM (ref 0.6–0.9)
ECHO LV MASS 2D: 123.1 G (ref 67–162)
ECHO LV MASS INDEX 2D: 69.9 G/M2 (ref 43–95)
ECHO LV POSTERIOR WALL DIASTOLIC: 0.9 CM (ref 0.6–0.9)
ECHO LV RELATIVE WALL THICKNESS RATIO: 0.4
ECHO LVOT PEAK GRADIENT: 2 MMHG
ECHO LVOT PEAK VELOCITY: 0.7 M/S
ECHO MV A VELOCITY: 0.58 M/S
ECHO MV AREA PHT: 6.2 CM2
ECHO MV E DECELERATION TIME (DT): 121.7 MS
ECHO MV E VELOCITY: 0.51 M/S
ECHO MV E/A RATIO: 0.88
ECHO MV E/E' LATERAL: 10.2
ECHO MV E/E' RATIO (AVERAGED): 9.35
ECHO MV E/E' SEPTAL: 8.5
ECHO MV PRESSURE HALF TIME (PHT): 35.3 MS
ECHO PV MAX VELOCITY: 0.7 M/S
ECHO PV PEAK GRADIENT: 2 MMHG
ECHO RIGHT VENTRICULAR SYSTOLIC PRESSURE (RVSP): 39 MMHG
ECHO RV TAPSE: 1.4 CM (ref 1.7–?)
ECHO TV REGURGITANT MAX VELOCITY: 2.44 M/S
ECHO TV REGURGITANT PEAK GRADIENT: 24 MMHG
ERYTHROCYTE [DISTWIDTH] IN BLOOD BY AUTOMATED COUNT: 13 % (ref 11.5–14.5)
GLOBULIN SER CALC-MCNC: 2.5 G/DL (ref 2–4)
GLUCOSE SERPL-MCNC: 110 MG/DL (ref 65–100)
GLUCOSE SERPL-MCNC: 92 MG/DL (ref 65–100)
GLUCOSE SERPL-MCNC: 97 MG/DL (ref 65–100)
GLUCOSE SERPL-MCNC: 98 MG/DL (ref 65–100)
HCT VFR BLD AUTO: 32.5 % (ref 35–47)
HGB BLD-MCNC: 11.8 G/DL (ref 11.5–16)
MCH RBC QN AUTO: 32.7 PG (ref 26–34)
MCHC RBC AUTO-ENTMCNC: 36.3 G/DL (ref 30–36.5)
MCV RBC AUTO: 90 FL (ref 80–99)
NRBC # BLD: 0 K/UL (ref 0–0.01)
NRBC BLD-RTO: 0 PER 100 WBC
OSMOLALITY UR: 73 MOSM/KG H2O
PHOSPHATE SERPL-MCNC: 2.8 MG/DL (ref 2.6–4.7)
PHOSPHATE SERPL-MCNC: 2.8 MG/DL (ref 2.6–4.7)
PHOSPHATE SERPL-MCNC: 2.9 MG/DL (ref 2.6–4.7)
PHOSPHATE SERPL-MCNC: 3.2 MG/DL (ref 2.6–4.7)
PLATELET # BLD AUTO: 266 K/UL (ref 150–400)
PMV BLD AUTO: 9.5 FL (ref 8.9–12.9)
POTASSIUM SERPL-SCNC: 3.4 MMOL/L (ref 3.5–5.1)
POTASSIUM SERPL-SCNC: 3.4 MMOL/L (ref 3.5–5.1)
POTASSIUM SERPL-SCNC: 3.5 MMOL/L (ref 3.5–5.1)
POTASSIUM SERPL-SCNC: 3.7 MMOL/L (ref 3.5–5.1)
POTASSIUM UR-SCNC: 3 MMOL/L
PROT SERPL-MCNC: 5.8 G/DL (ref 6.4–8.2)
RBC # BLD AUTO: 3.61 M/UL (ref 3.8–5.2)
SODIUM SERPL-SCNC: 123 MMOL/L (ref 136–145)
SODIUM SERPL-SCNC: 127 MMOL/L (ref 136–145)
SODIUM SERPL-SCNC: 130 MMOL/L (ref 136–145)
SODIUM SERPL-SCNC: 136 MMOL/L (ref 136–145)
SODIUM UR-SCNC: 13 MMOL/L
WBC # BLD AUTO: 7 K/UL (ref 3.6–11)

## 2022-08-23 PROCEDURE — 74011250636 HC RX REV CODE- 250/636: Performed by: STUDENT IN AN ORGANIZED HEALTH CARE EDUCATION/TRAINING PROGRAM

## 2022-08-23 PROCEDURE — 65620000000 HC RM CCU GENERAL

## 2022-08-23 PROCEDURE — 84300 ASSAY OF URINE SODIUM: CPT

## 2022-08-23 PROCEDURE — 74011250636 HC RX REV CODE- 250/636: Performed by: INTERNAL MEDICINE

## 2022-08-23 PROCEDURE — 71045 X-RAY EXAM CHEST 1 VIEW: CPT

## 2022-08-23 PROCEDURE — 80069 RENAL FUNCTION PANEL: CPT

## 2022-08-23 PROCEDURE — 84133 ASSAY OF URINE POTASSIUM: CPT

## 2022-08-23 PROCEDURE — 74011250637 HC RX REV CODE- 250/637: Performed by: PSYCHIATRY & NEUROLOGY

## 2022-08-23 PROCEDURE — 80076 HEPATIC FUNCTION PANEL: CPT

## 2022-08-23 PROCEDURE — 85027 COMPLETE CBC AUTOMATED: CPT

## 2022-08-23 PROCEDURE — 74011250637 HC RX REV CODE- 250/637: Performed by: STUDENT IN AN ORGANIZED HEALTH CARE EDUCATION/TRAINING PROGRAM

## 2022-08-23 PROCEDURE — 74011000250 HC RX REV CODE- 250: Performed by: STUDENT IN AN ORGANIZED HEALTH CARE EDUCATION/TRAINING PROGRAM

## 2022-08-23 PROCEDURE — 83935 ASSAY OF URINE OSMOLALITY: CPT

## 2022-08-23 RX ORDER — ALBUMIN HUMAN 50 G/1000ML
12.5 SOLUTION INTRAVENOUS ONCE
Status: DISCONTINUED | OUTPATIENT
Start: 2022-08-23 | End: 2022-08-23

## 2022-08-23 RX ORDER — MIDODRINE HYDROCHLORIDE 5 MG/1
5 TABLET ORAL
Status: DISCONTINUED | OUTPATIENT
Start: 2022-08-23 | End: 2022-08-24

## 2022-08-23 RX ORDER — 3% SODIUM CHLORIDE 3 G/100ML
25 INJECTION, SOLUTION INTRAVENOUS CONTINUOUS
Status: DISPENSED | OUTPATIENT
Start: 2022-08-23 | End: 2022-08-23

## 2022-08-23 RX ADMIN — ASPIRIN 81 MG CHEWABLE TABLET 81 MG: 81 TABLET CHEWABLE at 08:24

## 2022-08-23 RX ADMIN — SODIUM CHLORIDE, PRESERVATIVE FREE 10 ML: 5 INJECTION INTRAVENOUS at 14:34

## 2022-08-23 RX ADMIN — OXCARBAZEPINE 300 MG: 150 TABLET, FILM COATED ORAL at 21:32

## 2022-08-23 RX ADMIN — MIDODRINE HYDROCHLORIDE 5 MG: 5 TABLET ORAL at 11:37

## 2022-08-23 RX ADMIN — MIDODRINE HYDROCHLORIDE 5 MG: 5 TABLET ORAL at 17:47

## 2022-08-23 RX ADMIN — MIDODRINE HYDROCHLORIDE 5 MG: 5 TABLET ORAL at 07:25

## 2022-08-23 RX ADMIN — ATORVASTATIN CALCIUM 80 MG: 40 TABLET, FILM COATED ORAL at 08:24

## 2022-08-23 RX ADMIN — ENOXAPARIN SODIUM 40 MG: 100 INJECTION SUBCUTANEOUS at 15:30

## 2022-08-23 RX ADMIN — LEVETIRACETAM 500 MG: 500 TABLET, FILM COATED ORAL at 08:24

## 2022-08-23 RX ADMIN — CLOPIDOGREL BISULFATE 75 MG: 75 TABLET ORAL at 08:24

## 2022-08-23 RX ADMIN — SODIUM CHLORIDE, PRESERVATIVE FREE 10 ML: 5 INJECTION INTRAVENOUS at 22:00

## 2022-08-23 RX ADMIN — LEVETIRACETAM 500 MG: 500 TABLET, FILM COATED ORAL at 21:32

## 2022-08-23 RX ADMIN — SODIUM CHLORIDE 25 ML/HR: 3 INJECTION, SOLUTION INTRAVENOUS at 07:25

## 2022-08-23 RX ADMIN — PHENYLEPHRINE HYDROCHLORIDE 60 MCG/MIN: 10 INJECTION INTRAVENOUS at 09:10

## 2022-08-23 RX ADMIN — PHENOBARBITAL 324 MG: 64.8 TABLET ORAL at 21:32

## 2022-08-23 RX ADMIN — SODIUM CHLORIDE, PRESERVATIVE FREE 10 ML: 5 INJECTION INTRAVENOUS at 05:04

## 2022-08-23 NOTE — PROGRESS NOTES
0730: Bedside shift change report given to Palma Steele RN (oncoming nurse) by Vu Sanchez RN (offgoing nurse). Report included the following information SBAR, Kardex, Procedure Summary, MAR, Recent Results, Cardiac Rhythm NSR, and Dual Neuro Assessment. Both PIVs infiltrated and replaced with new 22 gauge PIVs.      1000: Multidisciplinary rounds were held 8/23/2022. Today's plan/goal includes (but not limited to): weaning jefferson gtt, decreasing pain, and increase pt mobilization. 1130: Na 130; Dr Lyle Cristina, Nephrology, notified. MD said to recheck in [facility] and if >130 can hold off next recheck until 8/24 0400 labs. 1745: Na 136; next recheck will be tomorrow @ 0400, per Dr Lyle Cristina. 1815: Jefferson gtt stopped. SBP goal 90 per Dr Jean Pierre Saravia.     Izzy Mention: Bedside shift change report given to Chayo Priest RN (oncoming nurse) by Palma Steele RN (offgoing nurse). Report included the following information SBAR, Kardex, ED Summary, OR Summary, Procedure Summary, Intake/Output, MAR, Recent Results, Cardiac Rhythm NSR, and Dual Neuro Assessment.

## 2022-08-23 NOTE — PROGRESS NOTES
0805 Bedside and Verbal shift change report given to Tia Polanco RN (oncoming nurse) by Torri Quinn (offgoing nurse). Report included the following information SBAR, Kardex, Intake/Output, Recent Results, Cardiac Rhythm NSR, SinusTach, and Alarm Parameters .    Patient Vitals for the past 4 hrs:   Temp Pulse Resp BP SpO2   08/23/22 0800 -- 99 20 -- 97 %   08/23/22 0730 -- 81 17 (!) 94/50 96 %   08/23/22 0700 -- 76 16 -- 96 %   08/23/22 0639 98.4 °F (36.9 °C) -- -- -- --   08/23/22 0630 -- 85 17 (!) 98/55 96 %   08/23/22 0600 -- 81 17 (!) 82/46 96 %   08/23/22 0530 -- 87 16 (!) 82/39 94 %   08/23/22 0500 -- 86 18 (!) 88/51 96 %   08/23/22 0430 -- 90 13 (!) 86/50 96 %

## 2022-08-23 NOTE — PROGRESS NOTES
[facility]   [address] [facility], [id number] [place] [address]  Phone: [phone]   [phone]    [email or web address]     Nephrology Progress Note    Patient Name : Juan F Wang      : 1965     MRN : [id number]  Date of Admission : 2022  Date of Servive : 22    CC:  Follow up for Hyponatremia        Assessment and Plan   Hyponatremia :  -  duration : unknown. No prior labs in many years, does not have PCP   - Etiology : Urine Osm : inappropriately high --> suggesting SIADH picture and likely culprit is Trileptal   - TSH and Cortisol : ok   - Needing 3% saline to correct Hyponatremia   - another dose of 3% saline today for a goal Na > 130 by the end of the day   - if she has to remain on Trileptal for long term, she may need Tolvaptan on d/c.   - ordered repeat Urine Chemistries today  - continue Q6 hr labs      STEMI :  - S/P PCI x 2   - EF 25%, elevated LVEDP on Cath   - hypotension : remain on 60 mcg of michael   - has R basal rales : ordered CXR   - per cards     Seizure Disorder  - appreciate Neurology consult     Hx of Hypothyroidism          Interval History:  Neurology consult noted   Na at 127 after another round of 3%   Now on half dose of Trileptal   She is hypotensive needing Michael. Received IV albumin without any improvement   She is relatively asymptomatic from hyponatremia       Review of Systems: A comprehensive review of systems was negative except for that written in the HPI.     Current Medications:   Current Facility-Administered Medications   Medication Dose Route Frequency    3% sodium chloride (*HIGH ALERT*CONCENTRATED IV*) infusion  25 mL/hr IntraVENous CONTINUOUS    albumin human 5% (BUMINATE) solution 12.5 g  12.5 g IntraVENous ONCE    PHENYLephrine (MICHAEL-SYNEPHRINE) 30 mg in 0.9% sodium chloride 250 mL infusion   mcg/min IntraVENous TITRATE    OXcarbazepine (TRILEPTAL) tablet 300 mg  300 mg Oral QHS    levETIRAcetam (KEPPRA) tablet 500 mg  500 mg Oral BID    sodium chloride (NS) flush 5-40 mL  5-40 mL IntraVENous Q8H    sodium chloride (NS) flush 5-40 mL  5-40 mL IntraVENous PRN    aspirin chewable tablet 81 mg  81 mg Oral DAILY    atorvastatin (LIPITOR) tablet 80 mg  80 mg Oral DAILY    [Held by provider] metoprolol tartrate (LOPRESSOR) tablet 12.5 mg  12.5 mg Oral Q12H    enoxaparin (LOVENOX) injection 40 mg  40 mg SubCUTAneous Q24H    PHENobarbitaL (LUMINAL) tablet 324 mg  324 mg Oral QHS    clopidogreL (PLAVIX) tablet 75 mg  75 mg Oral DAILY      Allergies   Allergen Reactions    Dilantin [Phenytoin Sodium Extended] Rash       Objective:  Vitals:    Vitals:    08/23/22 0400 08/23/22 0430 08/23/22 0500 08/23/22 0530   BP: (!) 88/54 (!) 86/50 (!) 88/51 (!) 82/39   Pulse: 89 90 86 87   Resp: 20 13 18 16   Temp: 98.3 °F (36.8 °C)      SpO2: 96% 96% 96% 94%   Weight:       Height:         Intake and Output:  08/22 1901 - 08/23 0700  In: 500.9 [P.O.:360; I.V.:140.9]  Out: -   08/21 0701 - 08/22 1900  In: 1471.1 [P.O.:240; I.V.:1231.1]  Out: 1800 [Urine:1800]    Physical Examination:        General: NAD,Conversant   Neck:  Supple, no mass  Resp:  R basal rales   CV:  RRR,  no murmur or rub, LE edema  GI:  Soft, NT, + BS, no HS megaly  Neurologic:  Non focal  Psych:             AAO x 3 appropriate affect       []    High complexity decision making was performed  []    Patient is at high-risk of decompensation with multiple organ involvement    Lab Data Personally Reviewed: I have reviewed all the pertinent labs, microbiology data and radiology studies during assessment.     Recent Labs     08/23/22  0523 08/22/22  2357 08/22/22  1827 08/22/22  0800 08/22/22  0357 08/22/22  0035 08/21/22  1740 08/21/22  1228 08/21/22  0651   * 123* 123* 123* 124*   < > 117*   < > 119*   K 3.5 3.4* 3.5 3.3* 3.7   < > 3.5   < > 3.6   CL 93* 89* 89* 89* 92*   < > 84*   < > 85*   CO2 23 23 24 26 24   < > 24   < > 22   GLU 97 110* 119* 141* 95   < > 146*   < > 130*   BUN 4* 7 8 5* 6   < > 6   < > 5*   CREA 0.44* 0.50* 0.53* 0.58 0.38*   < > 0.64   < > 0.60   CA 8.2* 8.0* 8.0* 8.1* 7.3*   < > 8.1*   < > 8.8   MG  --   --   --   --   --   --   --   --  1.6   PHOS 3.2 2.8 2.8 2.4*  --   --  2.1*   < >  --    ALB 3.2* 3.4* 3.7 2.9*  --   --  2.8*   < > 3.5   ALT  --   --   --   --   --   --   --   --  33    < > = values in this interval not displayed. Recent Labs     08/22/22  0357 08/21/22  0651   WBC 6.5 12.3*   HGB 12.4 14.6   HCT 33.6* 39.6    298       No results found for: SDES  No results found for: CULT          I have reviewed the flowsheets. Chart and Pertinent Notes have been reviewed. No change in PMH ,family and social history from Consult note.       Shay Marcum [facility]

## 2022-08-23 NOTE — PROGRESS NOTES
Cardiology Progress Note  2022    Admit Date: 2022  Admit Diagnosis: STEMI (ST elevation myocardial infarction) ([facility] [I21.3]  CC:  STEMI    STEMI - s/p PCI of proximal LAD which was culprit. PCI of hemodynamically significant LCx to OM1 lesion as well  - continue aspirin 81 mg daily and plavix 75 mg daily  - high intensity atorvastatin  - hold metoprolol due to hypotension    2. Hypotension -  no evidence of end organ dysfunction despite low BP and her baseline BP runs low. AM cortisol was normal.  - wean phenylephrine for MAP > 65  - start midodrine 5 mg TID     2. Acute HFrEF - LVEF severely reduced on ventriculogram. LVEDP moderately elevated 25-30 mmHg. Stable respiratory status and appears euvolemic. Echo with severely reduced LVEF and akinetic anteroseptal, anterior wall and apex suggestive of infarct in LAD territory  - unable to start GDMT due to hypotension  - will need to discuss [facility] before discharge     3. Severe hyponatremia   - appreciate nephrology input     4. Seizure d/o  - continue phenobarbital  - appreciate neurology input. Recommended weaning rather than stopping Trileptal. [facility]      Hospital problem list     Active Problems:    STEMI (ST elevation myocardial infarction) ([facility] (2022)      Acute HFrEF (heart failure with reduced ejection fraction) ([facility] (2022)                                                        Subjective:     Feels well. No chest pain or dyspnea. Remains on phenyelpherine    ROS: All other systems reviewed and were negative other than mentioned above. No change in family and social history from H&P/Consult note.     Objective:    Physical Exam:  24 hr VS reviewed, overall VSSAF  Temp (24hrs), Av.3 °F (36.8 °C), Min:97.5 °F (36.4 °C), Max:98.9 °F (37.2 °C)    Patient Vitals for the past 8 hrs:   Pulse   22 0530 87   22 0500 86   22 0430 90   22 0400 89 08/23/22 0330 89   08/23/22 0300 92   08/23/22 0230 87   08/23/22 0200 84   08/23/22 0130 83   08/23/22 0100 86   08/23/22 0030 92   08/23/22 0015 90   08/23/22 0000 92    Patient Vitals for the past 8 hrs:   Resp   08/23/22 0530 16   08/23/22 0500 18   08/23/22 0430 13   08/23/22 0400 20   08/23/22 0330 21   08/23/22 0300 19   08/23/22 0230 19   08/23/22 0200 19   08/23/22 0130 20   08/23/22 0100 26   08/23/22 0030 17   08/23/22 0015 21   08/23/22 0000 24    Patient Vitals for the past 8 hrs:   BP   08/23/22 0530 (!) 82/39   08/23/22 0500 (!) 88/51   08/23/22 0430 (!) 86/50   08/23/22 0400 (!) 88/54   08/23/22 0330 (!) 84/54   08/23/22 0300 (!) 85/48   08/23/22 0230 (!) 83/42   08/23/22 0200 (!) 81/44   08/23/22 0130 (!) 75/43   08/23/22 0100 (!) 96/50   08/23/22 0030 (!) 92/57   08/23/22 0000 (!) 94/57          Intake/Output Summary (Last 24 hours) at 8/23/2022 0746  Last data filed at 8/23/2022 0200  Gross per 24 hour   Intake 1046.75 ml   Output --   Net 1046.75 ml       General: resting comfortably in no distress  HEENT: sclera anicteric, moist mucous membranes  Neck: supple, no JVD or HJR  CV: regular rate and rhythm, normal S1 and S2, no murmurs, rubs or gallops,   Lungs: no respiratory distress, lungs clear to auscultation without wheezing or rales  Abdomen: + bowel sounds, soft, non distended, non tender  MSK: no lower extremity edema, right radial access site with small hematoma, good cap refill, sensation inact  Skin: warm to touch  Neuro: alert and oriented, answered questions appropriately  Psych: normal mood and affect    Data Review:  Lab results reviewed as noted below. Current medications reviewed as noted below.     Telemetry independently reviewed : [x]  sinus    []  chronic afib     []  par afib    []  NSVT    ECG independently reviewed: []  NSR    []  no significant changes   []  no new ECG provided for review    No results for input(s): PH, PCO2, PO2 in the last 72 hours. No results for input(s): CPK, CKMB in the last 72 hours. No lab exists for component: TROPONINI  Recent Labs     08/23/22  0523 08/22/22 2357 08/22/22 1827 08/22/22  0800 08/22/22  0357 08/21/22  1228 08/21/22  0651   * 123* 123*   < > 124*   < > 119*   K 3.5 3.4* 3.5   < > 3.7   < > 3.6   CL 93* 89* 89*   < > 92*   < > 85*   CO2 23 23 24   < > 24   < > 22   BUN 4* 7 8   < > 6   < > 5*   CREA 0.44* 0.50* 0.53*   < > 0.38*   < > 0.60   GLU 97 110* 119*   < > 95   < > 130*   PHOS 3.2 2.8 2.8   < >  --    < >  --    CA 8.2* 8.0* 8.0*   < > 7.3*   < > 8.8   ALB 3.2* 3.4* 3.7   < >  --    < > 3.5   WBC  --   --   --   --  6.5  --  12.3*   HGB  --   --   --   --  12.4  --  14.6   HCT  --   --   --   --  33.6*  --  39.6   PLT  --   --   --   --  228  --  298    < > = values in this interval not displayed. Recent Labs     08/23/22  0523 08/22/22 2357 08/22/22  1827 08/21/22  1228 08/21/22  0651   AP  --   --   --   --  87   TBILI  --   --   --   --  0.7   TP  --   --   --   --  7.3   ALB 3.2* 3.4* 3.7   < > 3.5   GLOB  --   --   --   --  3.8    < > = values in this interval not displayed. Recent Labs     08/22/22 0357   APTT 33.5*      No results for input(s): FE, TIBC, PSAT, FERR in the last 72 hours.    No results found for: GLUCPOC    Current Facility-Administered Medications   Medication Dose Route Frequency    3% sodium chloride (*HIGH ALERT*CONCENTRATED IV*) infusion  25 mL/hr IntraVENous CONTINUOUS    midodrine (PROAMATINE) tablet 5 mg  5 mg Oral TID WITH MEALS    PHENYLephrine (MICHAEL-SYNEPHRINE) 30 mg in 0.9% sodium chloride 250 mL infusion   mcg/min IntraVENous TITRATE    OXcarbazepine (TRILEPTAL) tablet 300 mg  300 mg Oral QHS    levETIRAcetam (KEPPRA) tablet 500 mg  500 mg Oral BID    sodium chloride (NS) flush 5-40 mL  5-40 mL IntraVENous Q8H    sodium chloride (NS) flush 5-40 mL  5-40 mL IntraVENous PRN    aspirin chewable tablet 81 mg  81 mg Oral DAILY    atorvastatin (LIPITOR) tablet 80 mg  80 mg Oral DAILY    [Held by provider] metoprolol tartrate (LOPRESSOR) tablet 12.5 mg  12.5 mg Oral Q12H    enoxaparin (LOVENOX) injection 40 mg  40 mg SubCUTAneous Q24H    PHENobarbitaL (LUMINAL) tablet 324 mg  324 mg Oral QHS    clopidogreL (PLAVIX) tablet 75 mg  75 mg Oral DAILY       Total critical care time - 45 minutes           MDM:  High  Risk of decompensation:  High    I personally spent the above critical care time. This is time spent at this critically ill patient's bedside / unit / floor actively involved in patient care as well as the coordination of care with consulting services, nurses and discussions with the patient's family. This does not include any procedural time which has been billed separately. This patient has a critical illness or injury that is acutely impairing one or more vital organ systems such that there is a high probability of imminent or life threatening deterioration in the patient's condition. This patient requires high complexity medical decision making to assess, manipulate, and support vital organ system failure. After stabilization, this patient still requires management to prevent further life / organ threatening deterioration. Hari Paz.  Len Maya, [address] [facility]  08/23/22

## 2022-08-23 NOTE — PROGRESS NOTES
Transitions of Care:    RUR - 7%    Disposition: Home     Follow-up: PCP/Specialist    Transport: Friend    Reason for Admission:  STEMI, Acute HF                     RUR Score:          7%           Plan for utilizing home health:      none    PCP: First and Last name:  Other, None, [place]     Name of Practice:    Are you a current patient: Yes/No:    Approximate date of last visit:    Can you participate in a virtual visit with your PCP: yes    Patient prefers to use her neurologist as her PCP - Dr. Villarreal Amen: Full Code      Healthcare Decision Maker:   Click here to complete [address] including selection of the Healthcare Decision Maker Relationship (ie \"Primary\")           CM educated patient about AMD - her legal NOK is her father but patient prefers her friends listed as her emegency contact at this time. Patient is  from her ex-  at this time. Pastoral care consulted to drop off AMD information for patient to review and considering completing to ensure medical team is following her wishes. Nursing made aware. Transition of Care Plan:                HARIS met with patient who is independent with her ADL's and driving - uses no DME- lives with her 2 13 yo children (boy and girl) and she is very involved in [employer] band. [facility] uses [facility] [address] for her meds and confirmed insurnace coverage on file. Patients friends will transport upon discharge.      RICKIE Motta

## 2022-08-23 NOTE — PROGRESS NOTES
Problem: Falls - Risk of  Goal: *Absence of Falls  Description: Document Ole Jc Fall Risk and appropriate interventions in the flowsheet.   8/22/2022 2315 by Demetri Allen RN  Outcome: Progressing Towards Goal  Note: Fall Risk Interventions:            Medication Interventions: Assess postural VS orthostatic hypotension    Elimination Interventions: Bed/chair exit alarm, Call light in reach           8/22/2022 2314 by Demetri Allen RN  Outcome: Progressing Towards Goal  Note: Fall Risk Interventions:            Medication Interventions: Assess postural VS orthostatic hypotension    Elimination Interventions: Bed/chair exit alarm, Call light in reach              Problem: Patient Education: Go to Patient Education Activity  Goal: Patient/Family Education  8/22/2022 2315 by Demetri Allen RN  Outcome: Progressing Towards Goal  8/22/2022 2314 by Demetri Allen RN  Outcome: Progressing Towards Goal     Problem: Patient Education: Go to Patient Education Activity  Goal: Patient/Family Education  Outcome: Progressing Towards Goal

## 2022-08-24 LAB
ALBUMIN SERPL-MCNC: 3 G/DL (ref 3.5–5)
ANION GAP SERPL CALC-SCNC: 8 MMOL/L (ref 5–15)
BUN SERPL-MCNC: 6 MG/DL (ref 6–20)
BUN/CREAT SERPL: 11 (ref 12–20)
CALCIUM SERPL-MCNC: 8 MG/DL (ref 8.5–10.1)
CHLORIDE SERPL-SCNC: 99 MMOL/L (ref 97–108)
CO2 SERPL-SCNC: 26 MMOL/L (ref 21–32)
CREAT SERPL-MCNC: 0.54 MG/DL (ref 0.55–1.02)
GLUCOSE SERPL-MCNC: 100 MG/DL (ref 65–100)
PHOSPHATE SERPL-MCNC: 3.3 MG/DL (ref 2.6–4.7)
POTASSIUM SERPL-SCNC: 3.8 MMOL/L (ref 3.5–5.1)
SODIUM SERPL-SCNC: 133 MMOL/L (ref 136–145)

## 2022-08-24 PROCEDURE — 74011000250 HC RX REV CODE- 250: Performed by: STUDENT IN AN ORGANIZED HEALTH CARE EDUCATION/TRAINING PROGRAM

## 2022-08-24 PROCEDURE — 74011250637 HC RX REV CODE- 250/637: Performed by: PSYCHIATRY & NEUROLOGY

## 2022-08-24 PROCEDURE — 65270000046 HC RM TELEMETRY

## 2022-08-24 PROCEDURE — 80069 RENAL FUNCTION PANEL: CPT

## 2022-08-24 PROCEDURE — 74011250637 HC RX REV CODE- 250/637: Performed by: STUDENT IN AN ORGANIZED HEALTH CARE EDUCATION/TRAINING PROGRAM

## 2022-08-24 PROCEDURE — 36415 COLL VENOUS BLD VENIPUNCTURE: CPT

## 2022-08-24 PROCEDURE — 74011250636 HC RX REV CODE- 250/636: Performed by: STUDENT IN AN ORGANIZED HEALTH CARE EDUCATION/TRAINING PROGRAM

## 2022-08-24 RX ORDER — MIDODRINE HYDROCHLORIDE 5 MG/1
2.5 TABLET ORAL
Status: DISCONTINUED | OUTPATIENT
Start: 2022-08-24 | End: 2022-08-26 | Stop reason: HOSPADM

## 2022-08-24 RX ADMIN — LEVETIRACETAM 500 MG: 500 TABLET, FILM COATED ORAL at 20:46

## 2022-08-24 RX ADMIN — EMPAGLIFLOZIN 10 MG: 10 TABLET, FILM COATED ORAL at 10:25

## 2022-08-24 RX ADMIN — ATORVASTATIN CALCIUM 80 MG: 40 TABLET, FILM COATED ORAL at 08:20

## 2022-08-24 RX ADMIN — MIDODRINE HYDROCHLORIDE 2.5 MG: 5 TABLET ORAL at 17:49

## 2022-08-24 RX ADMIN — MIDODRINE HYDROCHLORIDE 5 MG: 5 TABLET ORAL at 08:19

## 2022-08-24 RX ADMIN — LEVETIRACETAM 500 MG: 500 TABLET, FILM COATED ORAL at 08:19

## 2022-08-24 RX ADMIN — CLOPIDOGREL BISULFATE 75 MG: 75 TABLET ORAL at 08:19

## 2022-08-24 RX ADMIN — ENOXAPARIN SODIUM 40 MG: 100 INJECTION SUBCUTANEOUS at 15:09

## 2022-08-24 RX ADMIN — SODIUM CHLORIDE, PRESERVATIVE FREE 10 ML: 5 INJECTION INTRAVENOUS at 21:00

## 2022-08-24 RX ADMIN — ASPIRIN 81 MG CHEWABLE TABLET 81 MG: 81 TABLET CHEWABLE at 08:20

## 2022-08-24 RX ADMIN — PHENOBARBITAL 324 MG: 64.8 TABLET ORAL at 20:46

## 2022-08-24 RX ADMIN — MIDODRINE HYDROCHLORIDE 5 MG: 5 TABLET ORAL at 12:47

## 2022-08-24 RX ADMIN — SODIUM CHLORIDE, PRESERVATIVE FREE 10 ML: 5 INJECTION INTRAVENOUS at 06:00

## 2022-08-24 RX ADMIN — SODIUM CHLORIDE, PRESERVATIVE FREE 10 ML: 5 INJECTION INTRAVENOUS at 15:10

## 2022-08-24 RX ADMIN — OXCARBAZEPINE 300 MG: 150 TABLET, FILM COATED ORAL at 20:46

## 2022-08-24 NOTE — ACP (ADVANCE CARE PLANNING)
Advance Care Planning   Advance Care Planning Inpatient Note  [address] Department    Today's Date: 8/24/2022  Unit: [facility] CORONARY CARE    Received request from IDT member. Upon review of chart and communication with care team, patient's decision making abilities are not in question. Patient was/were present in the room during visit. Goals of ACP Conversation:  Discuss Advance Care planning documents    Health Care Decision Makers:    No healthcare decision makers have been documented. Click here to complete [address] including selection of the Healthcare Decision Maker Relationship (ie \"Primary\")  Summary:  Verified Documents  No Decision Maker named by patient at this time    Advance Care Planning Documents (Patient Wishes) on file:  None     Assessment:    Ms. Mayelin Garcia reports that her minor children are aware of what she would want in an emergency or at end of life. She realizes that her father would be her LNOK.  She would like to review the materials and complete the form later    Interventions:  Provided education on documents for clarity and greater understanding  Reviewed but did not complete ACP document    Care Preferences Communicated:  No    Outcomes/Plan:  ACP Discussion Refused    [facility] on 8/24/2022 at 8:39 AM

## 2022-08-24 NOTE — PROGRESS NOTES
Cardiology Progress Note  8/24/2022    Admit Date: 8/21/2022  Admit Diagnosis: STEMI (ST elevation myocardial infarction) ([facility] [I21.3]  CC:  STEMI    STEMI - s/p PCI of proximal LAD which was culprit 8/21. PCI of hemodynamically significant LCx to OM1 lesion as well  - continue aspirin 81 mg daily and plavix 75 mg daily  - high intensity atorvastatin  - hold metoprolol due to hypotension    2. Hypotension -  no evidence of end organ dysfunction despite low BP and her baseline BP runs low. AM cortisol was normal.  - MAP goal > 60 and SBP > 90 mmHg. - continue midodrine 5 mg TID     3. Acute HFrEF - LVEF severely reduced on ventriculogram. LVEDP moderately elevated 25-30 mmHg. Stable respiratory status and appears euvolemic. Echo with severely reduced LVEF and akinetic anteroseptal, anterior wall and apex suggestive of infarct in LAD territory  - unable to start beta blocker, ARNI/ACEi or MRA due to hypotension  - start empagliflozin 10 mg daily  - I discussed risk of arrhythmias with severely reduced LVEF and recommended LifeVest at discharge but she is not interested in this currently     3. Severe hyponatremia, resolved  - appreciate nephrology input     4. Seizure d/o  - continue phenobarbital  - appreciate neurology input. Recommended weaning rather than stopping Trileptal. Keppra added. Will need to follow up with outpatient neurologist Dr. Zaira Mariscal  for weaning further. Possible discharge to floor this afternoon. Hospital problem list     Active Problems:    STEMI (ST elevation myocardial infarction) ([facility] (8/21/2022)      Acute HFrEF (heart failure with reduced ejection fraction) ([facility] (8/22/2022)                                                        Subjective:     Feels well. No chest pain or dyspnea. Weaned off phenyephrine    ROS: All other systems reviewed and were negative other than mentioned above.                                             No change in family and social history from H&P/Consult note. Objective:    Physical Exam:  24 hr VS reviewed, overall VSSAF  Temp (24hrs), Av.9 °F (36.6 °C), Min:97.5 °F (36.4 °C), Max:98.2 °F (36.8 °C)    Patient Vitals for the past 8 hrs:   Pulse   22 0500 86   22 0400 91   22 0300 91   22 0200 89   22 0100 88   22 0000 94    Patient Vitals for the past 8 hrs:   Resp   22 0500 17   22 0400 18   22 0300 19   22 0200 18   22 0100 19   22 0000 25    Patient Vitals for the past 8 hrs:   BP   22 0500 (!) 90/51   22 0400 (!) 87/53   22 0300 (!) 88/50   22 0200 (!) 88/53   22 0100 (!) 90/57   22 0000 (!) 90/52          Intake/Output Summary (Last 24 hours) at 2022 0759  Last data filed at 2022 0500  Gross per 24 hour   Intake 471.33 ml   Output 950 ml   Net -478.67 ml       General: resting comfortably in no distress  HEENT: sclera anicteric, moist mucous membranes  Neck: supple, no JVD or HJR  CV: regular rate and rhythm, normal S1 and S2, no murmurs, rubs or gallops,   Lungs: no respiratory distress, lungs clear to auscultation without wheezing or rales  Abdomen: + bowel sounds, soft, non distended, non tender  MSK: no lower extremity edema, right radial access site with small hematoma, good cap refill, sensation inact  Skin: warm to touch  Neuro: alert and oriented, answered questions appropriately  Psych: normal mood and affect    Data Review:  Lab results reviewed as noted below. Current medications reviewed as noted below. Telemetry independently reviewed : [x]  sinus    []  chronic afib     []  par afib    []  NSVT    ECG independently reviewed: []  NSR    []  no significant changes   []  no new ECG provided for review    No results for input(s): PH, PCO2, PO2 in the last 72 hours. No results for input(s): CPK, CKMB in the last 72 hours.     No lab exists for component: TROPONINI  Recent Labs     08/24/22  0430 08/23/22 1738 08/23/22  1137 08/22/22  0800 08/22/22  0357   * 136 130*   < > 124*   K 3.8 3.4* 3.7   < > 3.7   CL 99 104 98   < > 92*   CO2 26 24 25   < > 24   BUN 6 5* 4*   < > 6   CREA 0.54* 0.44* 0.52*   < > 0.38*    92 98   < > 95   PHOS 3.3 2.8 2.9   < >  --    CA 8.0* 7.2* 8.6   < > 7.3*   ALB 3.0* 3.0* 3.6   < >  --    WBC  --   --  7.0  --  6.5   HGB  --   --  11.8  --  12.4   HCT  --   --  32.5*  --  33.6*   PLT  --   --  266  --  228    < > = values in this interval not displayed. Recent Labs     08/24/22 0430 08/23/22 1738 08/23/22  1137 08/23/22  0523   AP  --   --   --  60   TBILI  --   --   --  0.4   TP  --   --   --  5.8*   ALB 3.0* 3.0* 3.6 3.3*  3.2*   GLOB  --   --   --  2.5     Recent Labs     08/22/22  0357   APTT 33.5*      No results for input(s): FE, TIBC, PSAT, FERR in the last 72 hours.    No results found for: GLUCPOC    Current Facility-Administered Medications   Medication Dose Route Frequency    empagliflozin (JARDIANCE) tablet 10 mg  10 mg Oral DAILY    midodrine (PROAMATINE) tablet 5 mg  5 mg Oral TID WITH MEALS    PHENYLephrine (MICHAEL-SYNEPHRINE) 30 mg in 0.9% sodium chloride 250 mL infusion   mcg/min IntraVENous TITRATE    OXcarbazepine (TRILEPTAL) tablet 300 mg  300 mg Oral QHS    levETIRAcetam (KEPPRA) tablet 500 mg  500 mg Oral BID    sodium chloride (NS) flush 5-40 mL  5-40 mL IntraVENous Q8H    sodium chloride (NS) flush 5-40 mL  5-40 mL IntraVENous PRN    aspirin chewable tablet 81 mg  81 mg Oral DAILY    atorvastatin (LIPITOR) tablet 80 mg  80 mg Oral DAILY    [Held by provider] metoprolol tartrate (LOPRESSOR) tablet 12.5 mg  12.5 mg Oral Q12H    enoxaparin (LOVENOX) injection 40 mg  40 mg SubCUTAneous Q24H    PHENobarbitaL (LUMINAL) tablet 324 mg  324 mg Oral QHS    clopidogreL (PLAVIX) tablet 75 mg  75 mg Oral DAILY       Total critical care time - 45 minutes           MDM:  High  Risk of decompensation: High    I personally spent the above critical care time. This is time spent at this critically ill patient's bedside / unit / floor actively involved in patient care as well as the coordination of care with consulting services, nurses and discussions with the patient's family. This does not include any procedural time which has been billed separately. This patient has a critical illness or injury that is acutely impairing one or more vital organ systems such that there is a high probability of imminent or life threatening deterioration in the patient's condition. This patient requires high complexity medical decision making to assess, manipulate, and support vital organ system failure. After stabilization, this patient still requires management to prevent further life / organ threatening deterioration. Jeronimo Mascorro.  Sujey Rueda, [address] [facility]  08/24/22

## 2022-08-24 NOTE — PROGRESS NOTES
Spiritual Care Assessment/Progress Note  [facility]      NAME: Ciara Yang      MRN: [id number]  AGE: 64 y.o.  SEX: female  Restoration Affiliation: Tenriism   Language: English     8/24/2022     Total Time (in minutes): 26     Spiritual Assessment begun in [facility] CORONARY CARE through conversation with:         [x]Patient        [] Family    [] Friend(s)        Reason for Consult: Advance medical directive consult     Spiritual beliefs: (Please include comment if needed)     [] Identifies with a javier tradition:         [] Supported by a javier community:            [] Claims no spiritual orientation:           [] Seeking spiritual identity:                [] Adheres to an individual form of spirituality:           [x] Not able to assess:                           Identified resources for coping:      [] Prayer                               [] Music                  [] Guided Imagery     [] Family/friends                 [] Pet visits     [] Devotional reading                         [x] Unknown     [] Other:                                               Interventions offered during this visit: (See comments for more details)    Patient Interventions: Advance medical directive consult           Plan of Care:     [] Support spiritual and/or cultural needs    [x] Support AMD and/or advance care planning process      [] Support grieving process   [] Coordinate Rites and/or Rituals    [] Coordination with community clergy   [] No spiritual needs identified at this time   [] Detailed Plan of Care below (See Comments)  [] Make referral to Music Therapy  [] Make referral to Pet Therapy     [] Make referral to Addiction services  [] Make referral to [facility]  [] Make referral to Spiritual Care Partner  [] No future visits requested        [] Contact Spiritual Care for further referrals     Comments:   I visited with Ciara Yang at [facility] in 2017 [facility] and introduced [facility]. I reviewed patient's chart prior to this encounter; initiated a relationship of care and concern; explored beliefs and values relative to advance care planning documents. Assessment: Ms. Georgi Ramirez reports that her minor children are aware of what she would want in an emergency or at end of life. She realizes that her father would be her LNOK. She would like to review the materials and complete the form later. I informed Rod Curtis about availability and encouraged them to request additional support as needed. Rev.  Richa George MDiv, MS, [facility]  Staff

## 2022-08-24 NOTE — PROGRESS NOTES
[facility]   [address] [facility], [address] [place] [address]  Phone: [phone]   [phone]    [email or web address]     Nephrology Progress Note    Patient Name : Reynaldo Wilkinson      : 1965     MRN : [id number]  Date of Admission : 2022  Date of Servive : 22    CC:  Follow up for Hyponatremia        Assessment and Plan   Hyponatremia :  -  suspect she has some chronic component : likely from Trileptal   - she had poor solute and excess FW intake prior to admission   - she clearly lacked diluting capacity based on Urine Osm of  453, 777   - Urine Osm is now down to 73 after reducing the dose of Trileptal and giving 3% saline   - I have tried to explain her Water Homeostasis mechanisms , but she is fixated on her thoughts and understanding  - I think its safe to start [place] for d/c on current anti epileptics, 2L FW restriction and advised to maintain good solute intake (in the form of protein) and limit sodium intake due to depressed EF  - labs again in am     STEMI :  - S/P PCI x 2   - EF 25%, elevated LVEDP on Cath   - Hypotension: off Michael and stable w/ Midodrine  - per cards     Seizure Disorder  - on keppra, Trileptal and Phenobarb     Hx of Hypothyroidism          Interval History:  Na improved to 136 and down to 133 this am   BP stable off Michael   No new sx   I was unsuccesful to help her understand body water homeostasis mechanisms       Review of Systems: A comprehensive review of systems was negative except for that written in the HPI.     Current Medications:   Current Facility-Administered Medications   Medication Dose Route Frequency    midodrine (PROAMATINE) tablet 5 mg  5 mg Oral TID WITH MEALS    PHENYLephrine (MICHAEL-SYNEPHRINE) 30 mg in 0.9% sodium chloride 250 mL infusion   mcg/min IntraVENous TITRATE    OXcarbazepine (TRILEPTAL) tablet 300 mg  300 mg Oral QHS    levETIRAcetam (KEPPRA) tablet 500 mg  500 mg Oral BID    sodium chloride (NS) flush 5-40 mL  5-40 mL IntraVENous Q8H    sodium chloride (NS) flush 5-40 mL  5-40 mL IntraVENous PRN    aspirin chewable tablet 81 mg  81 mg Oral DAILY    atorvastatin (LIPITOR) tablet 80 mg  80 mg Oral DAILY    [Held by provider] metoprolol tartrate (LOPRESSOR) tablet 12.5 mg  12.5 mg Oral Q12H    enoxaparin (LOVENOX) injection 40 mg  40 mg SubCUTAneous Q24H    PHENobarbitaL (LUMINAL) tablet 324 mg  324 mg Oral QHS    clopidogreL (PLAVIX) tablet 75 mg  75 mg Oral DAILY      Allergies   Allergen Reactions    Dilantin [Phenytoin Sodium Extended] Rash       Objective:  Vitals:    Vitals:    08/24/22 0200 08/24/22 0300 08/24/22 0400 08/24/22 0500   BP: (!) 88/53 (!) 88/50 (!) 87/53 (!) 90/51   Pulse: 89 91 91 86   Resp: 18 19 18 17   Temp:   98 °F (36.7 °C)    SpO2: 96% 96% 94% 97%   Weight:    61.9 kg (136 lb 7.4 oz)   Height:         Intake and Output:  No intake/output data recorded. 08/22 1901 - 08/24 0700  In: 972.3 [P.O.:560; I.V.:412.3]  Out: 950 [Urine:950]    Physical Examination:        General: NAD,Conversant   Neck:  Supple, no mass  Resp:  CTA  CV:  RRR,  no murmur or rub, LE edema  GI:  Soft, NT, + BS, no HS megaly  Neurologic:  Non focal  Psych:             AAO x 3 appropriate affect       []    High complexity decision making was performed  []    Patient is at high-risk of decompensation with multiple organ involvement    Lab Data Personally Reviewed: I have reviewed all the pertinent labs, microbiology data and radiology studies during assessment.     Recent Labs     08/24/22  0430 08/23/22  1738 08/23/22  1137 08/23/22  0523 08/22/22  2357   * 136 130* 127* 123*   K 3.8 3.4* 3.7 3.5 3.4*   CL 99 104 98 93* 89*   CO2 26 24 25 23 23    92 98 97 110*   BUN 6 5* 4* 4* 7   CREA 0.54* 0.44* 0.52* 0.44* 0.50*   CA 8.0* 7.2* 8.6 8.2* 8.0*   PHOS 3.3 2.8 2.9 3.2 2.8   ALB 3.0* 3.0* 3.6 3.3*  3.2* 3.4*   ALT  --   --   --  20  --        Recent Labs     08/23/22  1137 08/22/22  0357 WBC 7.0 6.5   HGB 11.8 12.4   HCT 32.5* 33.6*    228       No results found for: SDES  No results found for: CULT          I have reviewed the flowsheets. Chart and Pertinent Notes have been reviewed. No change in PMH ,family and social history from Consult note.       Shay Kuhn [facility]

## 2022-08-25 LAB
ALBUMIN SERPL-MCNC: 3.2 G/DL (ref 3.5–5)
ANION GAP SERPL CALC-SCNC: 7 MMOL/L (ref 5–15)
BUN SERPL-MCNC: 4 MG/DL (ref 6–20)
BUN/CREAT SERPL: 7 (ref 12–20)
CALCIUM SERPL-MCNC: 8.3 MG/DL (ref 8.5–10.1)
CHLORIDE SERPL-SCNC: 101 MMOL/L (ref 97–108)
CO2 SERPL-SCNC: 26 MMOL/L (ref 21–32)
CREAT SERPL-MCNC: 0.55 MG/DL (ref 0.55–1.02)
GLUCOSE SERPL-MCNC: 91 MG/DL (ref 65–100)
PHOSPHATE SERPL-MCNC: 3.6 MG/DL (ref 2.6–4.7)
POTASSIUM SERPL-SCNC: 4.2 MMOL/L (ref 3.5–5.1)
SODIUM SERPL-SCNC: 134 MMOL/L (ref 136–145)

## 2022-08-25 PROCEDURE — 74011250637 HC RX REV CODE- 250/637: Performed by: PSYCHIATRY & NEUROLOGY

## 2022-08-25 PROCEDURE — 74011250636 HC RX REV CODE- 250/636: Performed by: STUDENT IN AN ORGANIZED HEALTH CARE EDUCATION/TRAINING PROGRAM

## 2022-08-25 PROCEDURE — 80069 RENAL FUNCTION PANEL: CPT

## 2022-08-25 PROCEDURE — 36415 COLL VENOUS BLD VENIPUNCTURE: CPT

## 2022-08-25 PROCEDURE — 74011250637 HC RX REV CODE- 250/637: Performed by: STUDENT IN AN ORGANIZED HEALTH CARE EDUCATION/TRAINING PROGRAM

## 2022-08-25 PROCEDURE — 65270000046 HC RM TELEMETRY

## 2022-08-25 PROCEDURE — 74011000250 HC RX REV CODE- 250: Performed by: STUDENT IN AN ORGANIZED HEALTH CARE EDUCATION/TRAINING PROGRAM

## 2022-08-25 RX ORDER — METOPROLOL SUCCINATE 25 MG/1
25 TABLET, EXTENDED RELEASE ORAL DAILY
Status: DISCONTINUED | OUTPATIENT
Start: 2022-08-25 | End: 2022-08-26 | Stop reason: HOSPADM

## 2022-08-25 RX ADMIN — SODIUM CHLORIDE, PRESERVATIVE FREE 10 ML: 5 INJECTION INTRAVENOUS at 21:03

## 2022-08-25 RX ADMIN — ATORVASTATIN CALCIUM 80 MG: 40 TABLET, FILM COATED ORAL at 08:44

## 2022-08-25 RX ADMIN — OXCARBAZEPINE 300 MG: 150 TABLET, FILM COATED ORAL at 21:01

## 2022-08-25 RX ADMIN — LEVETIRACETAM 500 MG: 500 TABLET, FILM COATED ORAL at 21:01

## 2022-08-25 RX ADMIN — EMPAGLIFLOZIN 10 MG: 10 TABLET, FILM COATED ORAL at 08:44

## 2022-08-25 RX ADMIN — ASPIRIN 81 MG CHEWABLE TABLET 81 MG: 81 TABLET CHEWABLE at 08:44

## 2022-08-25 RX ADMIN — SODIUM CHLORIDE, PRESERVATIVE FREE 10 ML: 5 INJECTION INTRAVENOUS at 06:00

## 2022-08-25 RX ADMIN — PHENOBARBITAL 324 MG: 64.8 TABLET ORAL at 21:00

## 2022-08-25 RX ADMIN — SODIUM CHLORIDE, PRESERVATIVE FREE 10 ML: 5 INJECTION INTRAVENOUS at 15:12

## 2022-08-25 RX ADMIN — ENOXAPARIN SODIUM 40 MG: 100 INJECTION SUBCUTANEOUS at 15:12

## 2022-08-25 RX ADMIN — LEVETIRACETAM 500 MG: 500 TABLET, FILM COATED ORAL at 08:44

## 2022-08-25 RX ADMIN — CLOPIDOGREL BISULFATE 75 MG: 75 TABLET ORAL at 08:44

## 2022-08-25 RX ADMIN — METOPROLOL SUCCINATE 25 MG: 25 TABLET, EXTENDED RELEASE ORAL at 12:27

## 2022-08-25 NOTE — PROGRESS NOTES
1129: TRANSFER - IN REPORT:    Verbal report received from [facility], RN (name) on Ange Gonzalez  being received from CCU (unit) for routine progression of care      Report consisted of patients Situation, Background, Assessment and   Recommendations(SBAR). Information from the following report(s) SBAR, Kardex, MAR, Accordion, Cardiac Rhythm NSR, and Quality Measures was reviewed with the receiving nurse. Opportunity for questions and clarification was provided. Assessment completed upon patients arrival to unit and care assumed. Problem: Falls - Risk of  Goal: *Absence of Falls  Description: Document Hoda Cedillo Fall Risk and appropriate interventions in the flowsheet.   Outcome: Progressing Towards Goal  Note: Fall Risk Interventions:            Medication Interventions: Patient to call before getting OOB, Teach patient to arise slowly    Elimination Interventions: Call light in reach              Problem: Heart Failure: Day 3  Goal: Activity/Safety  Outcome: Progressing Towards Goal  Goal: Nutrition/Diet  Outcome: Progressing Towards Goal

## 2022-08-25 NOTE — PROGRESS NOTES
Problem: Falls - Risk of  Goal: *Absence of Falls  Description: Document Raj Doyle Fall Risk and appropriate interventions in the flowsheet.   Outcome: Progressing Towards Goal  Note: Fall Risk Interventions:            Medication Interventions: Assess postural VS orthostatic hypotension    Elimination Interventions: Call light in reach              Problem: Patient Education: Go to Patient Education Activity  Goal: Patient/Family Education  Outcome: Progressing Towards Goal     Problem: Patient Education: Go to Patient Education Activity  Goal: Patient/Family Education  Outcome: Progressing Towards Goal     Problem: Heart Failure: Day 1  Goal: Off Pathway (Use only if patient is Off Pathway)  Outcome: Progressing Towards Goal  Goal: Activity/Safety  Outcome: Progressing Towards Goal  Goal: Consults, if ordered  Outcome: Progressing Towards Goal  Goal: Diagnostic Test/Procedures  Outcome: Progressing Towards Goal  Goal: Nutrition/Diet  Outcome: Progressing Towards Goal  Goal: Discharge Planning  Outcome: Progressing Towards Goal  Goal: Medications  Outcome: Progressing Towards Goal  Goal: Respiratory  Outcome: Progressing Towards Goal  Goal: Treatments/Interventions/Procedures  Outcome: Progressing Towards Goal  Goal: Psychosocial  Outcome: Progressing Towards Goal  Goal: *Oxygen saturation within defined limits  Outcome: Progressing Towards Goal  Goal: *Hemodynamically stable  Outcome: Progressing Towards Goal  Goal: *Optimal pain control at patient's stated goal  Outcome: Progressing Towards Goal  Goal: *Anxiety reduced or absent  Outcome: Progressing Towards Goal     Problem: Heart Failure: Day 2  Goal: Off Pathway (Use only if patient is Off Pathway)  Outcome: Progressing Towards Goal  Goal: Activity/Safety  Outcome: Progressing Towards Goal  Goal: Consults, if ordered  Outcome: Progressing Towards Goal  Goal: Diagnostic Test/Procedures  Outcome: Progressing Towards Goal  Goal: Nutrition/Diet  Outcome: Progressing Towards Goal  Goal: Discharge Planning  Outcome: Progressing Towards Goal  Goal: Medications  Outcome: Progressing Towards Goal  Goal: Respiratory  Outcome: Progressing Towards Goal  Goal: Treatments/Interventions/Procedures  Outcome: Progressing Towards Goal  Goal: Psychosocial  Outcome: Progressing Towards Goal  Goal: *Oxygen saturation within defined limits  Outcome: Progressing Towards Goal  Goal: *Hemodynamically stable  Outcome: Progressing Towards Goal  Goal: *Optimal pain control at patient's stated goal  Outcome: Progressing Towards Goal  Goal: *Anxiety reduced or absent  Outcome: Progressing Towards Goal  Goal: *Demonstrates progressive activity  Outcome: Progressing Towards Goal     Problem: Heart Failure: Day 3  Goal: Off Pathway (Use only if patient is Off Pathway)  Outcome: Progressing Towards Goal  Goal: Activity/Safety  Outcome: Progressing Towards Goal  Goal: Diagnostic Test/Procedures  Outcome: Progressing Towards Goal  Goal: Nutrition/Diet  Outcome: Progressing Towards Goal  Goal: Discharge Planning  Outcome: Progressing Towards Goal  Goal: Medications  Outcome: Progressing Towards Goal  Goal: Respiratory  Outcome: Progressing Towards Goal  Goal: Treatments/Interventions/Procedures  Outcome: Progressing Towards Goal  Goal: Psychosocial  Outcome: Progressing Towards Goal  Goal: *Oxygen saturation within defined limits  Outcome: Progressing Towards Goal  Goal: *Hemodynamically stable  Outcome: Progressing Towards Goal  Goal: *Optimal pain control at patient's stated goal  Outcome: Progressing Towards Goal  Goal: *Anxiety reduced or absent  Outcome: Progressing Towards Goal  Goal: *Demonstrates progressive activity  Outcome: Progressing Towards Goal     Problem: Heart Failure: Day 4  Goal: Off Pathway (Use only if patient is Off Pathway)  Outcome: Progressing Towards Goal  Goal: Activity/Safety  Outcome: Progressing Towards Goal  Goal: Diagnostic Test/Procedures  Outcome: Progressing Towards Goal  Goal: Nutrition/Diet  Outcome: Progressing Towards Goal  Goal: Discharge Planning  Outcome: Progressing Towards Goal  Goal: Medications  Outcome: Progressing Towards Goal  Goal: Respiratory  Outcome: Progressing Towards Goal  Goal: Treatments/Interventions/Procedures  Outcome: Progressing Towards Goal  Goal: Psychosocial  Outcome: Progressing Towards Goal  Goal: *Oxygen saturation within defined limits  Outcome: Progressing Towards Goal  Goal: *Hemodynamically stable  Outcome: Progressing Towards Goal  Goal: *Optimal pain control at patient's stated goal  Outcome: Progressing Towards Goal  Goal: *Anxiety reduced or absent  Outcome: Progressing Towards Goal  Goal: *Demonstrates progressive activity  Outcome: Progressing Towards Goal     Problem: Heart Failure: Day 5  Goal: Off Pathway (Use only if patient is Off Pathway)  Outcome: Progressing Towards Goal  Goal: Activity/Safety  Outcome: Progressing Towards Goal  Goal: Diagnostic Test/Procedures  Outcome: Progressing Towards Goal  Goal: Nutrition/Diet  Outcome: Progressing Towards Goal  Goal: Discharge Planning  Outcome: Progressing Towards Goal  Goal: Medications  Outcome: Progressing Towards Goal  Goal: Respiratory  Outcome: Progressing Towards Goal  Goal: Treatments/Interventions/Procedures  Outcome: Progressing Towards Goal  Goal: Psychosocial  Outcome: Progressing Towards Goal

## 2022-08-25 NOTE — PROGRESS NOTES
[facility]. 1000 Dr. Haydee Renae @bedside. Plans to initiate HF medications today. 2010 Report called to [facility], RN.     1130 Pt transported to Critical access hospital via wheelchair with RN/telemetry.

## 2022-08-25 NOTE — PROGRESS NOTES
Hospital follow-up PCP transitional care appointment has been scheduled with Dr. Irais Watts for Monday, August 29th, 2022 at 2:00 p.m. Elia Hoover Pending patient discharge.   Carly Menjivar, Care Management Assistant

## 2022-08-25 NOTE — PROGRESS NOTES
RISHABH: Anticipate discharge home pending medical progress. New PCP appointment needed. Transportation likely in car with friend. RUR: 6%    Disposition: Patient admitted 8/21 for STEMI. Lives in [facility] with 11 y/o son and daughter. Cardiology recommending Life Vest, but patient is declining. Potential discharge tomorrow, 8/26. Patient does not have a PCP and only follows her neurologist that is treating a seizure disorder. CM discussed PCP options with patient. She is assigned to the [facility], but is not willing to go as it is too far away. Patient prefers a [facility] physician in the [facility] requested assistance with scheduling from the CM specialist. CM will continue to follow as needed.     Selam Greenberg, [address] [facility],[address]  [phone]

## 2022-08-25 NOTE — PROGRESS NOTES
[facility]   [address] [facility], [id number] [place] [address]  Phone: [phone]   [phone]    [email or web address]     Nephrology Progress Note    Patient Name : Charly Mathias      : 1965     MRN : [id number]  Date of Admission : 2022  Date of Servive : 22    CC:  Follow up for Hyponatremia        Assessment and Plan   Hyponatremia :  -  suspect she has some chronic component : likely from Trileptal   -  she had poor solute and excess FW intake prior to admission   -  ok for d/c with 2L water restriction   - she has agreed to come to our office for f/u labs next week     STEMI :  - S/P PCI x 2   - EF 25%, elevated LVEDP on Cath   - Hypotension: off Michael and stable w/ Midodrine  - per cards     Seizure Disorder  - on keppra, Trileptal and Phenobarb     Hx of Hypothyroidism          Interval History:  Na stable at 134  BP stable on Midodrine   She refused life vest yesterday   No complaints today       Review of Systems: A comprehensive review of systems was negative except for that written in the HPI.     Current Medications:   Current Facility-Administered Medications   Medication Dose Route Frequency    empagliflozin (JARDIANCE) tablet 10 mg  10 mg Oral DAILY    [Held by provider] midodrine (PROAMATINE) tablet 2.5 mg  2.5 mg Oral TID WITH MEALS    PHENYLephrine (MICHAEL-SYNEPHRINE) 30 mg in 0.9% sodium chloride 250 mL infusion   mcg/min IntraVENous TITRATE    OXcarbazepine (TRILEPTAL) tablet 300 mg  300 mg Oral QHS    levETIRAcetam (KEPPRA) tablet 500 mg  500 mg Oral BID    sodium chloride (NS) flush 5-40 mL  5-40 mL IntraVENous Q8H    sodium chloride (NS) flush 5-40 mL  5-40 mL IntraVENous PRN    aspirin chewable tablet 81 mg  81 mg Oral DAILY    atorvastatin (LIPITOR) tablet 80 mg  80 mg Oral DAILY    [Held by provider] metoprolol tartrate (LOPRESSOR) tablet 12.5 mg  12.5 mg Oral Q12H    enoxaparin (LOVENOX) injection 40 mg  40 mg SubCUTAneous Q24H PHENobarbitaL (LUMINAL) tablet 324 mg  324 mg Oral QHS    clopidogreL (PLAVIX) tablet 75 mg  75 mg Oral DAILY      Allergies   Allergen Reactions    Dilantin [Phenytoin Sodium Extended] Rash       Objective:  Vitals:    Vitals:    08/24/22 1900 08/24/22 2000 08/24/22 2200 08/25/22 0000   BP: 102/61 106/65 115/70 119/71   Pulse: 100 93 (!) 101 (!) 101   Resp: 13 18 18 24   Temp:  98 °F (36.7 °C)  97.9 °F (36.6 °C)   SpO2: 98% 98% 99% 98%   Weight:       Height:         Intake and Output:  No intake/output data recorded. 08/23 1901 - 08/25 0700  In: 440 [P.O.:440]  Out: 950 [Urine:950]    Physical Examination:        General: NAD,Conversant   Neck:  Supple, no mass  Resp:  CTA  CV:  RRR,  no murmur or rub, LE edema  GI:  Soft, NT, + BS, no HS megaly  Neurologic:  Non focal  Psych:             AAO x 3 appropriate affect       []    High complexity decision making was performed  []    Patient is at high-risk of decompensation with multiple organ involvement    Lab Data Personally Reviewed: I have reviewed all the pertinent labs, microbiology data and radiology studies during assessment. Recent Labs     08/25/22  0615 08/24/22  0430 08/23/22  1738 08/23/22  1137 08/23/22  0523   * 133* 136 130* 127*   K 4.2 3.8 3.4* 3.7 3.5    99 104 98 93*   CO2 26 26 24 25 23   GLU 91 100 92 98 97   BUN 4* 6 5* 4* 4*   CREA 0.55 0.54* 0.44* 0.52* 0.44*   CA 8.3* 8.0* 7.2* 8.6 8.2*   PHOS 3.6 3.3 2.8 2.9 3.2   ALB 3.2* 3.0* 3.0* 3.6 3.3*  3.2*   ALT  --   --   --   --  20       Recent Labs     08/23/22  1137   WBC 7.0   HGB 11.8   HCT 32.5*          No results found for: SDES  No results found for: CULT          I have reviewed the flowsheets. Chart and Pertinent Notes have been reviewed. No change in PMH ,family and social history from Consult note.       Shay Montalvo [facility]

## 2022-08-25 NOTE — PROGRESS NOTES
Cardiology Progress Note  8/25/2022    Admit Date: 8/21/2022  Admit Diagnosis: STEMI (ST elevation myocardial infarction) ([facility] [I21.3]  CC:  STEMI    STEMI - s/p PCI of proximal LAD which was culprit 8/21. PCI of hemodynamically significant LCx to OM1 lesion as well  - continue aspirin 81 mg daily and plavix 75 mg daily  - high intensity atorvastatin    2. Hypotension -  improved  - MAP goal > 60 and SBP > 90 mmHg. - hold midodrine     3. Acute HFrEF - LVEF severely reduced on ventriculogram. LVEDP moderately elevated 25-30 mmHg. Stable respiratory status and appears euvolemic. Echo with severely reduced LVEF and akinetic anteroseptal, anterior wall and apex suggestive of infarct in LAD territory  - start Toprol 25 mg daily. Possible ARB this afternoon if BP stable  - unable to start ARNI/ACEi or MRA due to hypotension  - continue empagliflozin 10 mg daily  - I discussed risk of arrhythmias with severely reduced LVEF and recommended LifeVest at discharge but she is not interested in this currently     3. Severe hyponatremia, resolved  - appreciate nephrology input     4. Seizure d/o  - continue phenobarbital  - appreciate neurology input. Recommended weaning rather than stopping Trileptal. Keppra added. Will need to follow up with outpatient neurologist Dr. Tiana Armstrong  for weaning further. Kory Michaels for transfer to floor      Hospital problem list     Active Problems:    STEMI (ST elevation myocardial infarction) ([facility] (8/21/2022)      Acute HFrEF (heart failure with reduced ejection fraction) ([facility] (8/22/2022)                                                        Subjective:     Feels well. No chest pain or dyspnea. BP stable with holding midodrine this morning. ROS: All other systems reviewed and were negative other than mentioned above. No change in family and social history from H&P/Consult note.     Objective:    Physical Exam:  24 hr VS reviewed, overall VSSAF  Temp (24hrs), Av.2 °F (36.8 °C), Min:97.9 °F (36.6 °C), Max:98.7 °F (37.1 °C)    No data found. No data found. No data found. Intake/Output Summary (Last 24 hours) at 2022 1008  Last data filed at 2022 1600  Gross per 24 hour   Intake 440 ml   Output --   Net 440 ml       General: resting comfortably in no distress  HEENT: sclera anicteric, moist mucous membranes  Neck: supple, no JVD or HJR  CV: regular rate and rhythm, normal S1 and S2, no murmurs, rubs or gallops,   Lungs: no respiratory distress, lungs clear to auscultation without wheezing or rales  Abdomen: + bowel sounds, soft, non distended, non tender  MSK: no lower extremity edema, right radial access site with small hematoma, good cap refill, sensation inact  Skin: warm to touch  Neuro: alert and oriented, answered questions appropriately  Psych: normal mood and affect    Data Review:  Lab results reviewed as noted below. Current medications reviewed as noted below. Telemetry independently reviewed : [x]  sinus    []  chronic afib     []  par afib    []  NSVT    ECG independently reviewed: []  NSR    []  no significant changes   []  no new ECG provided for review    No results for input(s): PH, PCO2, PO2 in the last 72 hours. No results for input(s): CPK, CKMB in the last 72 hours.     No lab exists for component: TROPONINI  Recent Labs     22  0615 22  0430 22  1738 22  1137   * 133* 136 130*   K 4.2 3.8 3.4* 3.7    99 104 98   CO2 26 26 24 25   BUN 4* 6 5* 4*   CREA 0.55 0.54* 0.44* 0.52*   GLU 91 100 92 98   PHOS 3.6 3.3 2.8 2.9   CA 8.3* 8.0* 7.2* 8.6   ALB 3.2* 3.0* 3.0* 3.6   WBC  --   --   --  7.0   HGB  --   --   --  11.8   HCT  --   --   --  32.5*   PLT  --   --   --  266     Recent Labs     22  0615 22  0430 22  1738 22  1137 22  0523   AP  --   --   --   --  60   TBILI  --   --   --   --  0.4   TP  --   -- --   --  5.8*   ALB 3.2* 3.0* 3.0*   < > 3.3*  3.2*   GLOB  --   --   --   --  2.5    < > = values in this interval not displayed. No results for input(s): INR, PTP, APTT, INREXT, INREXT in the last 72 hours. No results for input(s): FE, TIBC, PSAT, FERR in the last 72 hours. No results found for: GLUCPOC    Current Facility-Administered Medications   Medication Dose Route Frequency    metoprolol succinate (TOPROL-XL) XL tablet 25 mg  25 mg Oral DAILY    empagliflozin (JARDIANCE) tablet 10 mg  10 mg Oral DAILY    [Held by provider] midodrine (PROAMATINE) tablet 2.5 mg  2.5 mg Oral TID WITH MEALS    PHENYLephrine (MICHAEL-SYNEPHRINE) 30 mg in 0.9% sodium chloride 250 mL infusion   mcg/min IntraVENous TITRATE    OXcarbazepine (TRILEPTAL) tablet 300 mg  300 mg Oral QHS    levETIRAcetam (KEPPRA) tablet 500 mg  500 mg Oral BID    sodium chloride (NS) flush 5-40 mL  5-40 mL IntraVENous Q8H    sodium chloride (NS) flush 5-40 mL  5-40 mL IntraVENous PRN    aspirin chewable tablet 81 mg  81 mg Oral DAILY    atorvastatin (LIPITOR) tablet 80 mg  80 mg Oral DAILY    enoxaparin (LOVENOX) injection 40 mg  40 mg SubCUTAneous Q24H    PHENobarbitaL (LUMINAL) tablet 324 mg  324 mg Oral QHS    clopidogreL (PLAVIX) tablet 75 mg  75 mg Oral DAILY         Ishan T. Leatha Epley, [address] [facility]  08/25/22

## 2022-08-26 VITALS
TEMPERATURE: 97.3 F | HEART RATE: 91 BPM | HEIGHT: 69 IN | BODY MASS INDEX: 21.78 KG/M2 | WEIGHT: 147.05 LBS | SYSTOLIC BLOOD PRESSURE: 105 MMHG | RESPIRATION RATE: 17 BRPM | DIASTOLIC BLOOD PRESSURE: 58 MMHG | OXYGEN SATURATION: 96 %

## 2022-08-26 PROBLEM — E87.1 HYPONATREMIA: Status: ACTIVE | Noted: 2022-08-26

## 2022-08-26 PROBLEM — G40.909 EPILEPSY (HCC): Status: ACTIVE | Noted: 2022-08-26

## 2022-08-26 LAB
ALBUMIN SERPL-MCNC: 3.4 G/DL (ref 3.5–5)
ANION GAP SERPL CALC-SCNC: 5 MMOL/L (ref 5–15)
BUN SERPL-MCNC: 7 MG/DL (ref 6–20)
BUN/CREAT SERPL: 10 (ref 12–20)
CALCIUM SERPL-MCNC: 8.3 MG/DL (ref 8.5–10.1)
CHLORIDE SERPL-SCNC: 100 MMOL/L (ref 97–108)
CO2 SERPL-SCNC: 29 MMOL/L (ref 21–32)
CREAT SERPL-MCNC: 0.69 MG/DL (ref 0.55–1.02)
GLUCOSE SERPL-MCNC: 91 MG/DL (ref 65–100)
PHOSPHATE SERPL-MCNC: 4.6 MG/DL (ref 2.6–4.7)
POTASSIUM SERPL-SCNC: 4.1 MMOL/L (ref 3.5–5.1)
SODIUM SERPL-SCNC: 134 MMOL/L (ref 136–145)

## 2022-08-26 PROCEDURE — 74011250637 HC RX REV CODE- 250/637: Performed by: PSYCHIATRY & NEUROLOGY

## 2022-08-26 PROCEDURE — 80069 RENAL FUNCTION PANEL: CPT

## 2022-08-26 PROCEDURE — 74011000250 HC RX REV CODE- 250: Performed by: STUDENT IN AN ORGANIZED HEALTH CARE EDUCATION/TRAINING PROGRAM

## 2022-08-26 PROCEDURE — 74011250637 HC RX REV CODE- 250/637: Performed by: STUDENT IN AN ORGANIZED HEALTH CARE EDUCATION/TRAINING PROGRAM

## 2022-08-26 PROCEDURE — 36415 COLL VENOUS BLD VENIPUNCTURE: CPT

## 2022-08-26 RX ORDER — CLOPIDOGREL BISULFATE 75 MG/1
75 TABLET ORAL DAILY
Qty: 30 TABLET | Refills: 11 | Status: SHIPPED | OUTPATIENT
Start: 2022-08-26

## 2022-08-26 RX ORDER — METOPROLOL SUCCINATE 25 MG/1
25 TABLET, EXTENDED RELEASE ORAL DAILY
Qty: 30 TABLET | Refills: 3 | Status: SHIPPED | OUTPATIENT
Start: 2022-08-26

## 2022-08-26 RX ORDER — LOSARTAN POTASSIUM 25 MG/1
12.5 TABLET ORAL DAILY
Qty: 30 TABLET | Refills: 3 | Status: SHIPPED | OUTPATIENT
Start: 2022-08-27

## 2022-08-26 RX ORDER — OXCARBAZEPINE 300 MG/1
300 TABLET, FILM COATED ORAL
Qty: 30 TABLET | Refills: 0 | Status: SHIPPED | OUTPATIENT
Start: 2022-08-26

## 2022-08-26 RX ORDER — GUAIFENESIN 100 MG/5ML
81 LIQUID (ML) ORAL DAILY
Qty: 30 TABLET | Refills: 11 | Status: SHIPPED | OUTPATIENT
Start: 2022-08-26

## 2022-08-26 RX ORDER — ATORVASTATIN CALCIUM 80 MG/1
80 TABLET, FILM COATED ORAL DAILY
Qty: 30 TABLET | Refills: 3 | Status: SHIPPED | OUTPATIENT
Start: 2022-08-26

## 2022-08-26 RX ORDER — LEVETIRACETAM 500 MG/1
500 TABLET ORAL 2 TIMES DAILY
Qty: 60 TABLET | Refills: 0 | Status: SHIPPED | OUTPATIENT
Start: 2022-08-26

## 2022-08-26 RX ORDER — LOSARTAN POTASSIUM 25 MG/1
12.5 TABLET ORAL DAILY
Status: DISCONTINUED | OUTPATIENT
Start: 2022-08-27 | End: 2022-08-26 | Stop reason: HOSPADM

## 2022-08-26 RX ADMIN — LEVETIRACETAM 500 MG: 500 TABLET, FILM COATED ORAL at 08:49

## 2022-08-26 RX ADMIN — ATORVASTATIN CALCIUM 80 MG: 40 TABLET, FILM COATED ORAL at 08:49

## 2022-08-26 RX ADMIN — CLOPIDOGREL BISULFATE 75 MG: 75 TABLET ORAL at 08:49

## 2022-08-26 RX ADMIN — EMPAGLIFLOZIN 10 MG: 10 TABLET, FILM COATED ORAL at 11:06

## 2022-08-26 RX ADMIN — SODIUM CHLORIDE, PRESERVATIVE FREE 10 ML: 5 INJECTION INTRAVENOUS at 06:17

## 2022-08-26 RX ADMIN — ASPIRIN 81 MG CHEWABLE TABLET 81 MG: 81 TABLET CHEWABLE at 08:49

## 2022-08-26 RX ADMIN — METOPROLOL SUCCINATE 25 MG: 25 TABLET, EXTENDED RELEASE ORAL at 08:50

## 2022-08-26 NOTE — DISCHARGE SUMMARY
Cardiology Discharge Summary     Patient ID: Silvio Casas  [id number]  29 y.o.  1965    Admit Date: 8/21/2022  Discharge Date: 8/26/2022   Admitting Physician: Lidia Alvarez MD   Discharge Physician: Federica Jon MD    Admission Diagnoses: STEMI (ST elevation myocardial infarction) [facility]) [I21.3]    Discharge Diagnoses: Active Problems:    STEM[facility]ST elevation [facility]) ([facility] (8/21/2022)      Acute HFrEF (heart failure with reduced ejection fraction) ([facility] (8/22/2022)      Hyponatremia (8/26/2022)      Epilepsy ([facility] (8/26/2022)        Cardiology Procedures this Admission:  Left heart catheterization with PCI    Hospital Course:  Alexa Brown presented with couple day history of worsening chest pain. She was noted to have anterolateral ST elevation and she was taken emergently to the cath lab by Dr. Margarita Hauser. There was severe proximal LAD stenosis that was opened with ROHINI. She also had hemodynamically significant proximal LCx into OM1 stenosis per iFR that was opened with ROHINI. LV gram showed severely reduced LVEF 25-30% which was confirmed on echo with findings of LAD territory infarct. Initiation of GDMT was limited due to hypotension. She was discharge on low dose toprol, losartan, and empagliflozin. MRA was deferred due to low BP. She will follow up with me in 1-2 weeks for further titration of meds. LifeVest was recommended but she refused and understood the risks of fatal arrhythmias. She was noted to be severely hyponatremic on admission. Nephrology was consulted. This was thought to be secondary to Trileptal. This was slowly corrected through the admission and it was stable at 134 at discharge. She was recommended 2L fluid restriction. Neurology was consulted for alternatives to Trileptal. They recommended weaning it. Phenobarbital was continued and Keppra was added per neurology recs.  She will follow up with her outpatient neurologist to be weaned off Trileptal.    She was was stable and able to ambulate without any symptoms at discharge. Patient was ambulating without symptoms prior to d/c. Consults: Nephrology and Neurology  Disposition: home  Discharge Condition: Good  Patient Instructions:   Cannot display discharge medications since this patient is not currently admitted. Referenced discharge instructions provided by nursing for diet and activity. Follow-up:   Follow-up Information       Follow up With Specialties Details Why Contact Info Additional Information    [address] Cardiac Rehabilitation Call after discharge to enroll in outpatient program [address] [address] [address] [facility] 330 [facility], [address]. Please arrive 15 minutes prior to your appointment time and you will register in the [facility], [address], on the first floor of the [address]. Telephone: [phone] Fax: [phone] Driving directions To [facility] Building: Driving WEST on [address], take exit [address] to [place] [place]. Turn left onto [address] [address], then turn right into [employer] Parking lot Driving EAST on [address], take exit [address] [facility]. Turn right at the end of the exit ramp. Turn left onto [address], then turn right into [employer] lot.     Juan Miguel Forbes MD Internal Medicine Physician Follow up Hospital follow up with PCP scheduled for Monday August 29th, 2022 at 2:00p.m. [address], [address] [place] [address] Vascular Surgery, Cardiovascular Disease Physician Follow up in 2 day(s)  [address] [place] [address],[address]  Aurelia Caldera MD Nephrology Follow up in 2 week(s)  [facility]  [phone]       Dr. Loly Huang - neurologist  Follow up in 2 week(s)       Trinity Rivero MD Cardiac Rehabilitation Patient not available to ask                 > 30 minutes coordinating discharge  Yes     Signed:  Clif Nevarez MD  8/26/2022  5:37 PM

## 2022-08-26 NOTE — PROGRESS NOTES
[facility]   [address] [facility], [address] [place] [address]  Phone: [phone]   [phone]    [email or web address]     Nephrology Progress Note    Patient Name : Stephenie Tsai      : 1965     MRN : [id number]  Date of Admission : 2022  Date of Servive : 22    CC:  Follow up for Hyponatremia        Assessment and Plan   Hyponatremia :  -  suspect she has some chronic component : likely from Trileptal   -  she had poor solute and excess FW intake prior to admission   -  ok for d/c with 2L water restriction   - she has agreed to come to our office for f/u labs next week     STEMI :  - S/P PCI x 2   - EF 25%, elevated LVEDP on Cath   - Hypotension: off Michael and stable w/ Midodrine  - per cards     Seizure Disorder  - on keppra, Trileptal and Phenobarb     Hx of Hypothyroidism          Interval History:  Seen and examined. Serum sodium has remained stable at 134. She will be followed at [address] [address] internal medicine  for primary care upon discharge. BP low but stable      Review of Systems: A comprehensive review of systems was negative except for that written in the HPI.     Current Medications:   Current Facility-Administered Medications   Medication Dose Route Frequency    [START ON 2022] losartan (COZAAR) tablet 12.5 mg  12.5 mg Oral DAILY    metoprolol succinate (TOPROL-XL) XL tablet 25 mg  25 mg Oral DAILY    empagliflozin (JARDIANCE) tablet 10 mg  10 mg Oral DAILY    [Held by provider] midodrine (PROAMATINE) tablet 2.5 mg  2.5 mg Oral TID WITH MEALS    PHENYLephrine (MICHAEL-SYNEPHRINE) 30 mg in 0.9% sodium chloride 250 mL infusion   mcg/min IntraVENous TITRATE    OXcarbazepine (TRILEPTAL) tablet 300 mg  300 mg Oral QHS    levETIRAcetam (KEPPRA) tablet 500 mg  500 mg Oral BID    sodium chloride (NS) flush 5-40 mL  5-40 mL IntraVENous Q8H    sodium chloride (NS) flush 5-40 mL  5-40 mL IntraVENous PRN    aspirin chewable tablet 81 mg  81 mg Oral DAILY    atorvastatin (LIPITOR) tablet 80 mg  80 mg Oral DAILY    enoxaparin (LOVENOX) injection 40 mg  40 mg SubCUTAneous Q24H    PHENobarbitaL (LUMINAL) tablet 324 mg  324 mg Oral QHS    clopidogreL (PLAVIX) tablet 75 mg  75 mg Oral DAILY      Allergies   Allergen Reactions    Dilantin [Phenytoin Sodium Extended] Rash       Objective:  Vitals:    Vitals:    08/26/22 0403 08/26/22 0603 08/26/22 0617 08/26/22 0723   BP:    (!) 105/58   Pulse: 86 88  85   Resp:    17   Temp:    97.3 °F (36.3 °C)   SpO2:    96%   Weight:   66.7 kg (147 lb 0.8 oz)    Height:         Intake and Output:  No intake/output data recorded. 08/24 1901 - 08/26 0700  In: 0 [P.O.:640]  Out: -     Physical Examination:        General: NAD,Conversant   Neck:  Supple, no mass  Resp:  CTA  CV:  RRR,  no murmur or rub, LE edema  GI:  Soft, NT, + BS, no HS megaly  Neurologic:  Non focal  Psych:             AAO x 3 appropriate affect       []    High complexity decision making was performed  []    Patient is at high-risk of decompensation with multiple organ involvement    Lab Data Personally Reviewed: I have reviewed all the pertinent labs, microbiology data and radiology studies during assessment. Recent Labs     08/26/22  0329 08/25/22  0615 08/24/22  0430 08/23/22  1738 08/23/22  1137   * 134* 133* 136 130*   K 4.1 4.2 3.8 3.4* 3.7    101 99 104 98   CO2 29 26 26 24 25   GLU 91 91 100 92 98   BUN 7 4* 6 5* 4*   CREA 0.69 0.55 0.54* 0.44* 0.52*   CA 8.3* 8.3* 8.0* 7.2* 8.6   PHOS 4.6 3.6 3.3 2.8 2.9   ALB 3.4* 3.2* 3.0* 3.0* 3.6       Recent Labs     08/23/22  1137   WBC 7.0   HGB 11.8   HCT 32.5*          No results found for: SDES  No results found for: CULT          I have reviewed the flowsheets. Chart and Pertinent Notes have been reviewed. No change in PMH ,family and social history from Consult note.       Shay Santacruz [facility]

## 2022-08-26 NOTE — PROGRESS NOTES
0730: Bedside and Verbal shift change report given to GIRISH Parr (oncoming nurse) by Shay Kc RN (offgoing nurse). Report included the following information SBAR, Kardex, MAR, Cardiac Rhythm NSR, and Quality Measures. 1230: I have reviewed discharge instructions with the patient. The patient verbalized understanding. Current Discharge Medication List        START taking these medications    Details   aspirin 81 mg chewable tablet Take 1 Tablet by mouth daily. Qty: 30 Tablet, Refills: 11  Start date: 8/26/2022    Associated Diagnoses: ST elevation myocardial infarction involving left anterior descending (LAD) coronary artery ([facility])      atorvastatin (LIPITOR) 80 mg tablet Take 1 Tablet by mouth daily. Qty: 30 Tablet, Refills: 3  Start date: 8/26/2022    Associated Diagnoses: ST elevation myocardial infarction involving left anterior descending (LAD) coronary artery ([facility])      clopidogreL (PLAVIX) 75 mg tab Take 1 Tablet by mouth daily. Qty: 30 Tablet, Refills: 11  Start date: 8/26/2022    Associated Diagnoses: ST elevation myocardial infarction involving left anterior descending (LAD) coronary artery ([facility])      empagliflozin (JARDIANCE) 10 mg tablet Take 1 Tablet by mouth daily. Qty: 30 Tablet, Refills: 3  Start date: 8/26/2022    Associated Diagnoses: Acute HFrEF (heart failure with reduced ejection fraction) ([facility])      levETIRAcetam (KEPPRA) 500 mg tablet Take 1 Tablet by mouth two (2) times a day. Qty: 60 Tablet, Refills: 0  Start date: 8/26/2022    Associated Diagnoses: Nonintractable epilepsy without status epilepticus, unspecified epilepsy type ([facility]      metoprolol succinate (TOPROL-XL) 25 mg XL tablet Take 1 Tablet by mouth daily. Qty: 30 Tablet, Refills: 3  Start date: 8/26/2022    Associated Diagnoses: Acute HFrEF (heart failure with reduced ejection fraction) ([facility])      losartan (COZAAR) 25 mg tablet Take 0.5 Tablets by mouth daily.   Qty: 30 Tablet, Refills: 3  Start date: 8/27/2022 Associated Diagnoses: Acute HFrEF (heart failure with reduced ejection fraction) ([facility])           CONTINUE these medications which have CHANGED    Details   OXcarbazepine (TRILEPTAL) 300 mg tablet Take 1 Tablet by mouth nightly. Qty: 30 Tablet, Refills: 0  Start date: 8/26/2022    Associated Diagnoses: Nonintractable epilepsy without status epilepticus, unspecified epilepsy type ([facility]           CONTINUE these medications which have NOT CHANGED    Details   PHENOBARBITAL, BULK, Take 324 mg by mouth nightly. Problem: Falls - Risk of  Goal: *Absence of Falls  Description: Document Earma Waqar Fall Risk and appropriate interventions in the flowsheet.   Outcome: Resolved/Met  Note: Fall Risk Interventions:            Medication Interventions: (P) Patient to call before getting OOB    Elimination Interventions: (P) Call light in reach              Problem: Patient Education: Go to Patient Education Activity  Goal: Patient/Family Education  Outcome: Resolved/Met     Problem: Patient Education: Go to Patient Education Activity  Goal: Patient/Family Education  Outcome: Resolved/Met     Problem: Heart Failure: Day 1  Goal: Off Pathway (Use only if patient is Off Pathway)  Outcome: Resolved/Met  Goal: Activity/Safety  Outcome: Resolved/Met  Goal: Consults, if ordered  Outcome: Resolved/Met  Goal: Diagnostic Test/Procedures  Outcome: Resolved/Met  Goal: Nutrition/Diet  Outcome: Resolved/Met  Goal: Discharge Planning  Outcome: Resolved/Met  Goal: Medications  Outcome: Resolved/Met  Goal: Respiratory  Outcome: Resolved/Met  Goal: Treatments/Interventions/Procedures  Outcome: Resolved/Met  Goal: Psychosocial  Outcome: Resolved/Met  Goal: *Oxygen saturation within defined limits  Outcome: Resolved/Met  Goal: *Hemodynamically stable  Outcome: Resolved/Met  Goal: *Optimal pain control at patient's stated goal  Outcome: Resolved/Met  Goal: *Anxiety reduced or absent  Outcome: Resolved/Met     Problem: Heart Failure: Day 2  Goal: Off Pathway (Use only if patient is Off Pathway)  Outcome: Resolved/Met  Goal: Activity/Safety  Outcome: Resolved/Met  Goal: Consults, if ordered  Outcome: Resolved/Met  Goal: Diagnostic Test/Procedures  Outcome: Resolved/Met  Goal: Nutrition/Diet  Outcome: Resolved/Met  Goal: Discharge Planning  Outcome: Resolved/Met  Goal: Medications  Outcome: Resolved/Met  Goal: Respiratory  Outcome: Resolved/Met  Goal: Treatments/Interventions/Procedures  Outcome: Resolved/Met  Goal: Psychosocial  Outcome: Resolved/Met  Goal: *Oxygen saturation within defined limits  Outcome: Resolved/Met  Goal: *Hemodynamically stable  Outcome: Resolved/Met  Goal: *Optimal pain control at patient's stated goal  Outcome: Resolved/Met  Goal: *Anxiety reduced or absent  Outcome: Resolved/Met  Goal: *Demonstrates progressive activity  Outcome: Resolved/Met     Problem: Heart Failure: Day 3  Goal: Off Pathway (Use only if patient is Off Pathway)  Outcome: Resolved/Met  Goal: Activity/Safety  Outcome: Resolved/Met  Goal: Diagnostic Test/Procedures  Outcome: Resolved/Met  Goal: Nutrition/Diet  Outcome: Resolved/Met  Goal: Discharge Planning  Outcome: Resolved/Met  Goal: Medications  Outcome: Resolved/Met  Goal: Respiratory  Outcome: Resolved/Met  Goal: Treatments/Interventions/Procedures  Outcome: Resolved/Met  Goal: Psychosocial  Outcome: Resolved/Met  Goal: *Oxygen saturation within defined limits  Outcome: Resolved/Met  Goal: *Hemodynamically stable  Outcome: Resolved/Met  Goal: *Optimal pain control at patient's stated goal  Outcome: Resolved/Met  Goal: *Anxiety reduced or absent  Outcome: Resolved/Met  Goal: *Demonstrates progressive activity  Outcome: Resolved/Met     Problem: Heart Failure: Day 4  Goal: Off Pathway (Use only if patient is Off Pathway)  Outcome: Resolved/Met  Goal: Activity/Safety  Outcome: Resolved/Met  Goal: Diagnostic Test/Procedures  Outcome: Resolved/Met  Goal: Nutrition/Diet  Outcome: Resolved/Met  Goal: Discharge Planning  Outcome: Resolved/Met  Goal: Medications  Outcome: Resolved/Met  Goal: Respiratory  Outcome: Resolved/Met  Goal: Treatments/Interventions/Procedures  Outcome: Resolved/Met  Goal: Psychosocial  Outcome: Resolved/Met  Goal: *Oxygen saturation within defined limits  Outcome: Resolved/Met  Goal: *Hemodynamically stable  Outcome: Resolved/Met  Goal: *Optimal pain control at patient's stated goal  Outcome: Resolved/Met  Goal: *Anxiety reduced or absent  Outcome: Resolved/Met  Goal: *Demonstrates progressive activity  Outcome: Resolved/Met     Problem: Heart Failure: Day 5  Goal: Off Pathway (Use only if patient is Off Pathway)  Outcome: Resolved/Met  Goal: Activity/Safety  Outcome: Resolved/Met  Goal: Diagnostic Test/Procedures  Outcome: Resolved/Met  Goal: Nutrition/Diet  Outcome: Resolved/Met  Goal: Discharge Planning  Outcome: Resolved/Met  Goal: Medications  Outcome: Resolved/Met  Goal: Respiratory  Outcome: Resolved/Met  Goal: Treatments/Interventions/Procedures  Outcome: Resolved/Met  Goal: Psychosocial  Outcome: Resolved/Met

## 2022-08-26 NOTE — PROGRESS NOTES
Problem: Falls - Risk of  Goal: *Absence of Falls  Description: Document Harsha Benigno Fall Risk and appropriate interventions in the flowsheet.   Outcome: Progressing Towards Goal  Note: Fall Risk Interventions:            Medication Interventions: Patient to call before getting OOB, Teach patient to arise slowly    Elimination Interventions: Call light in reach              Problem: Patient Education: Go to Patient Education Activity  Goal: Patient/Family Education  Outcome: Progressing Towards Goal     Problem: Heart Failure: Day 5  Goal: Discharge Planning  Outcome: Progressing Towards Goal  Goal: Medications  Outcome: Progressing Towards Goal  Goal: Psychosocial  Outcome: Progressing Towards Goal

## 2022-08-26 NOTE — PROGRESS NOTES
Cardiology Progress Note  2022    Admit Date: 2022  Admit Diagnosis: STEMI (ST elevation myocardial infarction) ([facility] [I21.3]  CC:  STEMI    STEMI - s/p PCI of proximal LAD which was culprit . PCI of hemodynamically significant LCx to OM1 lesion as well  - continue aspirin 81 mg daily and plavix 75 mg daily  - high intensity atorvastatin     2. Acute HFrEF - LVEF severely reduced on ventriculogram. LVEDP moderately elevated 25-30 mmHg. Stable respiratory status and appears euvolemic. Echo with severely reduced LVEF and akinetic anteroseptal, anterior wall and apex suggestive of infarct in LAD territory  - continue Toprol 25 mg daily. - will start Losartan 12.5 mg daily at disccharge  - will consider MRA as outpatient  - continue empagliflozin 10 mg daily  - I discussed risk of arrhythmias with severely reduced LVEF and recommended LifeVest at discharge but she is not interested in this currently     3. Severe hyponatremia, resolved  - appreciate nephrology input     4. Seizure d/o  - continue phenobarbital  - appreciate neurology input. Recommended weaning rather than stopping Trileptal. Keppra added. Will need to follow up with outpatient neurologist Dr. Bud Villarreal  for weaning further. Discharge this morning if able to ambulate without any issues. Follow up with me in 2 weeks. Hospital problem list     Active Problems:    STEMI (ST elevation myocardial infarction) ([facility] (2022)      Acute HFrEF (heart failure with reduced ejection fraction) ([facility] (2022)                                                        Subjective:     Feels well. No chest pain or dyspnea. BP stable with Toprol    ROS: All other systems reviewed and were negative other than mentioned above. No change in family and social history from H&P/Consult note.     Objective:    Physical Exam:  24 hr VS reviewed, overall VSSAF  Temp (24hrs), Av.5 °F (36.9 °C), Min:97.3 °F (36.3 °C), Max:99.2 °F (37.3 °C)    Patient Vitals for the past 8 hrs:   Pulse   08/26/22 0723 85   08/26/22 0603 88   08/26/22 0403 86   08/26/22 0320 92   08/26/22 0219 84   08/26/22 0121 87      Patient Vitals for the past 8 hrs:   Resp   08/26/22 0723 17   08/26/22 0320 16   08/26/22 0121 18      Patient Vitals for the past 8 hrs:   BP   08/26/22 0723 (!) 105/58   08/26/22 0320 (!) 95/51   08/26/22 0121 113/68            Intake/Output Summary (Last 24 hours) at 8/26/2022 0814  Last data filed at 8/25/2022 1512  Gross per 24 hour   Intake 640 ml   Output --   Net 640 ml       General: resting comfortably in no distress  HEENT: sclera anicteric, moist mucous membranes  Neck: supple, no JVD or HJR  CV: regular rate and rhythm, normal S1 and S2, no murmurs, rubs or gallops,   Lungs: no respiratory distress, lungs clear to auscultation without wheezing or rales  Abdomen: + bowel sounds, soft, non distended, non tender  MSK: no lower extremity edema, right radial access site with evolving ecchymosis, good cap refill, sensation inact  Skin: warm to touch  Neuro: alert and oriented, answered questions appropriately  Psych: normal mood and affect    Data Review:  Lab results reviewed as noted below. Current medications reviewed as noted below. Telemetry independently reviewed : [x]  sinus    []  chronic afib     []  par afib    []  NSVT    ECG independently reviewed: []  NSR    []  no significant changes   []  no new ECG provided for review    No results for input(s): PH, PCO2, PO2 in the last 72 hours. No results for input(s): CPK, CKMB in the last 72 hours.     No lab exists for component: TROPONINI  Recent Labs     08/26/22  0329 08/25/22  0615 08/24/22  0430 08/23/22  1738 08/23/22  1137   * 134* 133*   < > 130*   K 4.1 4.2 3.8   < > 3.7    101 99   < > 98   CO2 29 26 26   < > 25   BUN 7 4* 6   < > 4*   CREA 0.69 0.55 0.54*   < > 0.52*   GLU 91 91 100   < > 98   PHOS 4.6 3.6 3.3   < > 2.9   CA 8.3* 8.3* 8.0*   < > 8.6   ALB 3.4* 3.2* 3.0*   < > 3.6   WBC  --   --   --   --  7.0   HGB  --   --   --   --  11.8   HCT  --   --   --   --  32.5*   PLT  --   --   --   --  266    < > = values in this interval not displayed. Recent Labs     08/26/22  0329 08/25/22  0615 08/24/22  0430   ALB 3.4* 3.2* 3.0*     No results for input(s): INR, PTP, APTT, INREXT, INREXT in the last 72 hours. No results for input(s): FE, TIBC, PSAT, FERR in the last 72 hours. No results found for: GLUCPOC    Current Facility-Administered Medications   Medication Dose Route Frequency    metoprolol succinate (TOPROL-XL) XL tablet 25 mg  25 mg Oral DAILY    empagliflozin (JARDIANCE) tablet 10 mg  10 mg Oral DAILY    [Held by provider] midodrine (PROAMATINE) tablet 2.5 mg  2.5 mg Oral TID WITH MEALS    PHENYLephrine (MICHAEL-SYNEPHRINE) 30 mg in 0.9% sodium chloride 250 mL infusion   mcg/min IntraVENous TITRATE    OXcarbazepine (TRILEPTAL) tablet 300 mg  300 mg Oral QHS    levETIRAcetam (KEPPRA) tablet 500 mg  500 mg Oral BID    sodium chloride (NS) flush 5-40 mL  5-40 mL IntraVENous Q8H    sodium chloride (NS) flush 5-40 mL  5-40 mL IntraVENous PRN    aspirin chewable tablet 81 mg  81 mg Oral DAILY    atorvastatin (LIPITOR) tablet 80 mg  80 mg Oral DAILY    enoxaparin (LOVENOX) injection 40 mg  40 mg SubCUTAneous Q24H    PHENobarbitaL (LUMINAL) tablet 324 mg  324 mg Oral QHS    clopidogreL (PLAVIX) tablet 75 mg  75 mg Oral DAILY         Gautam Rodrigues Darryl, [address] [facility]  08/26/22

## 2022-08-29 ENCOUNTER — OFFICE VISIT (OUTPATIENT)
Dept: INTERNAL MEDICINE CLINIC | Age: 57
End: 2022-08-29
Payer: COMMERCIAL

## 2022-08-29 VITALS
BODY MASS INDEX: 22.66 KG/M2 | WEIGHT: 144.4 LBS | OXYGEN SATURATION: 96 % | DIASTOLIC BLOOD PRESSURE: 64 MMHG | HEART RATE: 95 BPM | RESPIRATION RATE: 16 BRPM | SYSTOLIC BLOOD PRESSURE: 98 MMHG | TEMPERATURE: 97.1 F | HEIGHT: 67 IN

## 2022-08-29 DIAGNOSIS — E87.1 HYPONATREMIA: ICD-10-CM

## 2022-08-29 DIAGNOSIS — I25.2 HISTORY OF MI (MYOCARDIAL INFARCTION): ICD-10-CM

## 2022-08-29 DIAGNOSIS — I50.21 ACUTE HFREF (HEART FAILURE WITH REDUCED EJECTION FRACTION) (HCC): ICD-10-CM

## 2022-08-29 DIAGNOSIS — G40.909 NONINTRACTABLE EPILEPSY WITHOUT STATUS EPILEPTICUS, UNSPECIFIED EPILEPSY TYPE (HCC): ICD-10-CM

## 2022-08-29 PROCEDURE — 99203 OFFICE O/P NEW LOW 30 MIN: CPT | Performed by: INTERNAL MEDICINE

## 2022-08-29 RX ORDER — TETANUS TOXOID, REDUCED DIPHTHERIA TOXOID AND ACELLULAR PERTUSSIS VACCINE, ADSORBED 5; 2.5; 8; 8; 2.5 [IU]/.5ML; [IU]/.5ML; UG/.5ML; UG/.5ML; UG/.5ML
0.5 SUSPENSION INTRAMUSCULAR ONCE
Qty: 0.5 ML | Refills: 0 | Status: CANCELLED | OUTPATIENT
Start: 2022-08-29 | End: 2022-08-29

## 2022-08-29 RX ORDER — PHENOBARBITAL 64.8 MG/1
TABLET ORAL
COMMUNITY
Start: 2022-07-27

## 2022-08-29 NOTE — ASSESSMENT & PLAN NOTE
STEMI s/p 2 ROHINI 8/2022  Will continue under the care of Dr Calrita heaton, limited by low BP  Encouraged her to have BP cuff at home  Discussed plan for cardiac rehab and diet suggestions

## 2022-08-29 NOTE — ASSESSMENT & PLAN NOTE
Has been seizure free 20+ Years  Under the care of Dr Maritza Ang  Will continue working with him on issue of trileptal titration as it was thought to contribute to hyponatremia

## 2022-08-29 NOTE — ASSESSMENT & PLAN NOTE
EF 30% recently, some improvement anticipated with recovery and med management, will continue close cardiology follow up

## 2023-01-05 DIAGNOSIS — G40.909 NONINTRACTABLE EPILEPSY WITHOUT STATUS EPILEPTICUS, UNSPECIFIED EPILEPSY TYPE (HCC): ICD-10-CM

## 2023-01-05 RX ORDER — SPIRONOLACTONE 25 MG/1
12.5 TABLET ORAL DAILY
COMMUNITY
Start: 2022-12-17

## 2023-01-05 RX ORDER — LEVETIRACETAM 500 MG/1
1500 TABLET ORAL 2 TIMES DAILY
Qty: 60 TABLET | Refills: 0
Start: 2023-01-05

## 2023-04-29 RX ORDER — LEVETIRACETAM 500 MG/1
1500 TABLET ORAL 2 TIMES DAILY
COMMUNITY
Start: 2023-01-05

## 2023-04-29 RX ORDER — METOPROLOL SUCCINATE 25 MG/1
25 TABLET, EXTENDED RELEASE ORAL DAILY
COMMUNITY
Start: 2022-08-26

## 2023-04-29 RX ORDER — ASPIRIN 81 MG/1
81 TABLET, CHEWABLE ORAL DAILY
COMMUNITY
Start: 2022-08-26

## 2023-04-29 RX ORDER — SPIRONOLACTONE 25 MG/1
12.5 TABLET ORAL DAILY
COMMUNITY
Start: 2022-12-17

## 2023-04-29 RX ORDER — CLOPIDOGREL BISULFATE 75 MG/1
75 TABLET ORAL DAILY
COMMUNITY
Start: 2022-08-26

## 2023-04-29 RX ORDER — PHENOBARBITAL 64.8 MG/1
TABLET ORAL
COMMUNITY
Start: 2022-07-27

## 2023-04-29 RX ORDER — ATORVASTATIN CALCIUM 80 MG/1
80 TABLET, FILM COATED ORAL DAILY
COMMUNITY
Start: 2022-08-26

## 2023-04-29 RX ORDER — LOSARTAN POTASSIUM 25 MG/1
12.5 TABLET ORAL DAILY
COMMUNITY
Start: 2022-08-27

## 2025-05-12 ENCOUNTER — OFFICE VISIT (OUTPATIENT)
Age: 60
End: 2025-05-12
Payer: COMMERCIAL

## 2025-05-12 VITALS
RESPIRATION RATE: 18 BRPM | WEIGHT: 147 LBS | BODY MASS INDEX: 23.29 KG/M2 | SYSTOLIC BLOOD PRESSURE: 90 MMHG | DIASTOLIC BLOOD PRESSURE: 60 MMHG

## 2025-05-12 DIAGNOSIS — G40.909 SEIZURE DISORDER (HCC): Primary | ICD-10-CM

## 2025-05-12 PROCEDURE — 99204 OFFICE O/P NEW MOD 45 MIN: CPT | Performed by: PSYCHIATRY & NEUROLOGY

## 2025-05-12 PROCEDURE — 99214 OFFICE O/P EST MOD 30 MIN: CPT | Performed by: PSYCHIATRY & NEUROLOGY

## 2025-05-12 RX ORDER — EZETIMIBE 10 MG/1
10 TABLET ORAL DAILY
COMMUNITY

## 2025-05-12 NOTE — PROGRESS NOTES
OXC in the past due to hyponatremia and PHT due to reported rash. Will obtain updated AED levels today to ensure levels are within therapeutic range. She is very concerned regarding any potential reduction in PHB levels. I advised her I am ok with continued her current medications as long as levels appear appropriate/non-toxic. Also discussed importance of regular screening for osteopenia through her PCP.   Plan:   Obtain PHB, LEV levels    Return in about 6 months (around 11/12/2025).    I have discussed the diagnosis with the patient today and the intended plan as seen in the above orders with both the patient as well as referring provider and/or PCP via electronic correspondence. The patient has received an after-visit summary and questions were answered concerning future plans. I have discussed medication side effects and warnings with the patient as well.      Signed:  Vannesa Fuentes DO  5/12/2025  10:46 AM

## 2025-05-13 LAB
ALBUMIN SERPL-MCNC: 4.7 G/DL (ref 3.8–4.9)
ALP SERPL-CCNC: 90 IU/L (ref 44–121)
ALT SERPL-CCNC: 10 IU/L (ref 0–32)
AST SERPL-CCNC: 18 IU/L (ref 0–40)
BASOPHILS # BLD AUTO: 0 X10E3/UL (ref 0–0.2)
BASOPHILS NFR BLD AUTO: 1 %
BILIRUB SERPL-MCNC: <0.2 MG/DL (ref 0–1.2)
BUN SERPL-MCNC: 7 MG/DL (ref 6–24)
BUN/CREAT SERPL: 9 (ref 9–23)
CALCIUM SERPL-MCNC: 9.2 MG/DL (ref 8.7–10.2)
CHLORIDE SERPL-SCNC: 101 MMOL/L (ref 96–106)
CO2 SERPL-SCNC: 22 MMOL/L (ref 20–29)
CREAT SERPL-MCNC: 0.75 MG/DL (ref 0.57–1)
EGFRCR SERPLBLD CKD-EPI 2021: 92 ML/MIN/1.73
EOSINOPHIL # BLD AUTO: 0.3 X10E3/UL (ref 0–0.4)
EOSINOPHIL NFR BLD AUTO: 4 %
ERYTHROCYTE [DISTWIDTH] IN BLOOD BY AUTOMATED COUNT: 15.5 % (ref 11.7–15.4)
GLOBULIN SER CALC-MCNC: 2.3 G/DL (ref 1.5–4.5)
GLUCOSE SERPL-MCNC: 109 MG/DL (ref 70–99)
HCT VFR BLD AUTO: 38.8 % (ref 34–46.6)
HGB BLD-MCNC: 12.6 G/DL (ref 11.1–15.9)
IMM GRANULOCYTES # BLD AUTO: 0 X10E3/UL (ref 0–0.1)
IMM GRANULOCYTES NFR BLD AUTO: 0 %
LYMPHOCYTES # BLD AUTO: 1.9 X10E3/UL (ref 0.7–3.1)
LYMPHOCYTES NFR BLD AUTO: 32 %
MCH RBC QN AUTO: 29.4 PG (ref 26.6–33)
MCHC RBC AUTO-ENTMCNC: 32.5 G/DL (ref 31.5–35.7)
MCV RBC AUTO: 90 FL (ref 79–97)
MONOCYTES # BLD AUTO: 0.7 X10E3/UL (ref 0.1–0.9)
MONOCYTES NFR BLD AUTO: 11 %
NEUTROPHILS # BLD AUTO: 3 X10E3/UL (ref 1.4–7)
NEUTROPHILS NFR BLD AUTO: 52 %
PLATELET # BLD AUTO: 214 X10E3/UL (ref 150–450)
POTASSIUM SERPL-SCNC: 4.1 MMOL/L (ref 3.5–5.2)
PROT SERPL-MCNC: 7 G/DL (ref 6–8.5)
RBC # BLD AUTO: 4.29 X10E6/UL (ref 3.77–5.28)
SODIUM SERPL-SCNC: 140 MMOL/L (ref 134–144)
WBC # BLD AUTO: 5.9 X10E3/UL (ref 3.4–10.8)

## 2025-05-14 LAB — LEVETIRACETAM SERPL-MCNC: 27.1 UG/ML (ref 10–40)

## 2025-05-15 ENCOUNTER — TELEPHONE (OUTPATIENT)
Age: 60
End: 2025-05-15

## 2025-05-15 LAB — PHENOBARB FREE SERPL-MCNC: 28.2 UG/ML (ref 6–20)

## 2025-05-15 NOTE — TELEPHONE ENCOUNTER
S/w pt.  She denies any s/s of toxicity from elevated phenobarb level that was reported critically high at 28.2.  pt has been on phenobarb for the past 20 years, seizures are controlled.    S/w Dr. Horan who advised to forward msg to Dr. Fuentes.  No changes made right now since pt has been on this medication for a long time and has no sx.

## 2025-05-15 NOTE — TELEPHONE ENCOUNTER
[facility] rep Sammi called to state there is a critical value to report.    The patient's phenobarbital was flagged critically high at 28.2    Sammi is available for a call back at [phone].

## 2025-05-16 DIAGNOSIS — G40.909 SEIZURE DISORDER (HCC): Primary | ICD-10-CM

## 2025-05-16 RX ORDER — PHENOBARBITAL 64.8 MG/1
TABLET ORAL
Qty: 450 TABLET | Refills: 1 | Status: SHIPPED | OUTPATIENT
Start: 2025-05-16 | End: 2025-11-17

## 2025-05-16 RX ORDER — LEVETIRACETAM 500 MG/1
TABLET ORAL
Qty: 270 TABLET | Refills: 1 | Status: SHIPPED | OUTPATIENT
Start: 2025-05-16

## (undated) DEVICE — ANGIOGRAPHIC CATHETER: Brand: IMPULSE™

## (undated) DEVICE — HOLDER SHRP TEMP SH STP DISP -- ADVANTAGE

## (undated) DEVICE — COPILOT BLEEDBACK CONTROL VALVE: Brand: COPILOT

## (undated) DEVICE — CATH BLLN DIL 2.0X12MM RX -- EUPHORA

## (undated) DEVICE — Device: Brand: OMNIWIRE PRESSURE GUIDE WIRE

## (undated) DEVICE — MEDI-TRACE CADENCE ADULT, DEFIBRILLATION ELECTRODE -RTS  (10 PR/PK) - PHYSIO-CONTROL: Brand: MEDI-TRACE CADENCE

## (undated) DEVICE — KIT MFLD ISOLATN NACL CNTRST PRT TBNG SPIK W/ PRSS TRNSDUC

## (undated) DEVICE — NEONATAL-ADULT SPO2 SENSOR: Brand: NELLCOR

## (undated) DEVICE — CATH BLLN DIL 2.5 X12MM RX -- EUPHORA

## (undated) DEVICE — KIT MED IMAG CNTRST AGNT W/ IOPAMIDOL REUSE

## (undated) DEVICE — BAG TRASH WST 122 CM 183 CM 1400 CC FEMALE LUER PVC DEPOT

## (undated) DEVICE — TR BAND RADIAL ARTERY COMPRESSION DEVICE: Brand: TR BAND

## (undated) DEVICE — KIT HND CTRL 3 W STPCOCK ROT END 54IN PREM HI PRSS TBNG AT

## (undated) DEVICE — SPLINT WR POS F/ARTERIAL ACC -- BX/10

## (undated) DEVICE — CUSTOM KT PTCA INFL DEV K05 00052M

## (undated) DEVICE — PACK PROCEDURE SURG HRT CATH

## (undated) DEVICE — CATH ANGI BLLN DIL 3.0X08MM -- NC EUPHORA

## (undated) DEVICE — GLIDESHEATH SLENDER STAINLESS STEEL KIT: Brand: GLIDESHEATH SLENDER

## (undated) DEVICE — SPECIAL PROCEDURE DRAPE 32" X 34": Brand: SPECIAL PROCEDURE DRAPE

## (undated) DEVICE — GUIDEWIRE VASC L260CM DIA0.035IN TIP L3MM STD EXCHG PTFE J

## (undated) DEVICE — RUNTHROUGH NS EXTRA FLOPPY PTCA GUIDEWIRE: Brand: RUNTHROUGH

## (undated) DEVICE — CATH GUID COR EB35 6FR 100CM -- LAUNCHER

## (undated) DEVICE — CATHETER GUID TIG4 5 FRX100 CM SM ATRAUM SFT CONVEY